# Patient Record
Sex: MALE | Race: WHITE | NOT HISPANIC OR LATINO | URBAN - METROPOLITAN AREA
[De-identification: names, ages, dates, MRNs, and addresses within clinical notes are randomized per-mention and may not be internally consistent; named-entity substitution may affect disease eponyms.]

---

## 2017-10-03 DIAGNOSIS — I10 BENIGN ESSENTIAL HYPERTENSION: ICD-10-CM

## 2017-10-05 RX ORDER — HYDROCHLOROTHIAZIDE 25 MG/1
12.5 TABLET ORAL DAILY
Qty: 30 TABLET | Refills: 0 | Status: SHIPPED | OUTPATIENT
Start: 2017-10-05 | End: 2017-12-26

## 2017-12-21 NOTE — PROGRESS NOTES
SUBJECTIVE:                                                    Franklin Mullen is a 42 year old male who presents to clinic today for the following health issues:    Hypertension Follow-up      Outpatient blood pressures are being checked at home.  Results are 145/90.    Low Salt Diet: no added salt        Amount of exercise or physical activity: None    Problems taking medications regularly: No    Medication side effects: none    Diet: regular (no restrictions)      Due for labs.  Blood pressure is uncontrolled/over goal: 140/90 at home typically.  Diastolic is high here.   Is not fasting but needs kidney function today. He will return fasting.  Is on HCTZ only 12.5 mg, never taken more. Was on lisinopril  Gave him cough and norvasc gave him headaches.     Decreased caffeine use. Less soda.   Alcohol 10-12 beers/week typically.  Does not feel depressed or feel need to help with alcohol use, he feels he can cut down on his own to help with blood pressure.   Exercise-used to exercise, does not anymore.  He knows he has to work on this and weill.     No chest pain, shortness of breath, pnd, or orthopnea.  Never smoked but chews. Discussed nicotine as stimulant. Encouraged cessation.   No dizziness or leg edema        Problem list and histories reviewed & adjusted, as indicated.  Additional history: as documented    Patient Active Problem List   Diagnosis     Hyperlipidemia with target LDL less than 130     Hypertension goal BP (blood pressure) < 140/90     Testicular/scrotal pain     Family history of premature coronary artery disease     Past Surgical History:   Procedure Laterality Date     VASECTOMY  6/23/2005       Social History   Substance Use Topics     Smoking status: Never Smoker     Smokeless tobacco: Current User     Types: Chew     Alcohol use 0.0 oz/week     0 Standard drinks or equivalent per week      Comment: occ.more in summer time-in the summer time a few beers a few times a week.       Family  "History   Problem Relation Age of Onset     Hypertension Father      HEART DISEASE Father 39     MI, stents age 50, was smoker     DIABETES Father      type 2     Hypertension Paternal Grandmother      CEREBROVASCULAR DISEASE Paternal Grandmother      in her 60's, smoker     Thyroid Disease No family hx of      OSTEOPOROSIS No family hx of      Genetic Disorder No family hx of      Prostate Cancer No family hx of      Colon Cancer No family hx of          Current Outpatient Prescriptions   Medication Sig Dispense Refill     hydrochlorothiazide (HYDRODIURIL) 25 MG tablet Take 1 tablet (25 mg) by mouth daily 90 tablet 1     [DISCONTINUED] hydrochlorothiazide (HYDRODIURIL) 25 MG tablet Take 0.5 tablets (12.5 mg) by mouth daily Patient needs lab and provider appointment for more refills 30 tablet 0     cyclobenzaprine (FLEXERIL) 5 MG tablet Take 1-2 tablets (5-10 mg) by mouth 3 times daily as needed for muscle spasms Do not drive or operate heavy machinery for 4 hours after taking. (Patient not taking: Reported on 12/26/2017) 42 tablet 1     Allergies   Allergen Reactions     Lisinopril Cough     Norvasc [Amlodipine] Other (See Comments)     headaches         ROS:  Constitutional, HEENT, cardiovascular, pulmonary, GI, , musculoskeletal, neuro, skin, endocrine and psych systems are negative, except as otherwise noted.      OBJECTIVE:   BP (!) 128/96  Pulse 86  Temp 98.5  F (36.9  C) (Oral)  Ht 5' 10\" (1.778 m)  Wt 222 lb (100.7 kg)  SpO2 98%  BMI 31.85 kg/m2  Body mass index is 31.85 kg/(m^2).  GENERAL: alert and no distress  RESP: lungs clear to auscultation - no rales, rhonchi or wheezes  CV: regular rate and rhythm, normal S1 S2, no S3 or S4, no murmur, click or rub, no peripheral edema and peripheral pulses strong  MS: no gross musculoskeletal defects noted, no edema  NEURO: Normal strength and tone, mentation intact and speech normal  PSYCH: mentation appears normal, affect " normal/bright        ASSESSMENT/PLAN:   ASSESSMENT / PLAN:  (I10) Benign essential hypertension  (primary encounter diagnosis)  Comment: above goal  Plan: hydrochlorothiazide (HYDRODIURIL) 25 MG tablet          Increase hCTZ      Patient Instructions   mychart with blood pressure readings in 3-4 weeks after making changes discussed  Goal:  Less than 130/80 for blood pressure  I will follow up with labs    IF blood pressure does not go down, lets add losartan to your blood pressure regime        Work on decreasing eliminating alcohol, nicotine, and adding exercise  Add losartan as next step if needed  Recheck in clinic in 1-2 months fasting for labs  Lose weight/overweight     Aisha Brunson PA-C  [facility]

## 2017-12-26 ENCOUNTER — OFFICE VISIT (OUTPATIENT)
Dept: FAMILY MEDICINE | Facility: CLINIC | Age: 42
End: 2017-12-26
Payer: COMMERCIAL

## 2017-12-26 VITALS
DIASTOLIC BLOOD PRESSURE: 96 MMHG | WEIGHT: 222 LBS | HEART RATE: 86 BPM | OXYGEN SATURATION: 98 % | SYSTOLIC BLOOD PRESSURE: 128 MMHG | HEIGHT: 70 IN | TEMPERATURE: 98.5 F | BODY MASS INDEX: 31.78 KG/M2

## 2017-12-26 DIAGNOSIS — I10 BENIGN ESSENTIAL HYPERTENSION: Primary | ICD-10-CM

## 2017-12-26 LAB
ALBUMIN SERPL-MCNC: 4 G/DL (ref 3.4–5)
ALP SERPL-CCNC: 68 U/L (ref 40–150)
ALT SERPL W P-5'-P-CCNC: 46 U/L (ref 0–70)
ANION GAP SERPL CALCULATED.3IONS-SCNC: 7 MMOL/L (ref 3–14)
AST SERPL W P-5'-P-CCNC: 17 U/L (ref 0–45)
BILIRUB SERPL-MCNC: 0.5 MG/DL (ref 0.2–1.3)
BUN SERPL-MCNC: 16 MG/DL (ref 7–30)
CALCIUM SERPL-MCNC: 8.8 MG/DL (ref 8.5–10.1)
CHLORIDE SERPL-SCNC: 105 MMOL/L (ref 94–109)
CO2 SERPL-SCNC: 26 MMOL/L (ref 20–32)
CREAT SERPL-MCNC: 0.94 MG/DL (ref 0.66–1.25)
GFR SERPL CREATININE-BSD FRML MDRD: 88 ML/MIN/1.7M2
GLUCOSE SERPL-MCNC: 88 MG/DL (ref 70–99)
POTASSIUM SERPL-SCNC: 3.8 MMOL/L (ref 3.4–5.3)
PROT SERPL-MCNC: 7.8 G/DL (ref 6.8–8.8)
SODIUM SERPL-SCNC: 138 MMOL/L (ref 133–144)

## 2017-12-26 PROCEDURE — 36415 COLL VENOUS BLD VENIPUNCTURE: CPT | Performed by: PHYSICIAN ASSISTANT

## 2017-12-26 PROCEDURE — 99214 OFFICE O/P EST MOD 30 MIN: CPT | Performed by: PHYSICIAN ASSISTANT

## 2017-12-26 PROCEDURE — 80053 COMPREHEN METABOLIC PANEL: CPT | Performed by: PHYSICIAN ASSISTANT

## 2017-12-26 RX ORDER — HYDROCHLOROTHIAZIDE 25 MG/1
25 TABLET ORAL DAILY
Qty: 90 TABLET | Refills: 1 | Status: SHIPPED | OUTPATIENT
Start: 2017-12-26 | End: 2019-01-16

## 2017-12-26 NOTE — NURSING NOTE
"Chief Complaint   Patient presents with     Hypertension     BP med check       Initial BP (!) 170/115  Pulse 86  Temp 98.5  F (36.9  C) (Oral)  Ht 5' 10\" (1.778 m)  Wt 222 lb (100.7 kg)  SpO2 98%  BMI 31.85 kg/m2 Estimated body mass index is 31.85 kg/(m^2) as calculated from the following:    Height as of this encounter: 5' 10\" (1.778 m).    Weight as of this encounter: 222 lb (100.7 kg).  Medication Reconciliation: complete      Valentín Laura MA    "

## 2017-12-26 NOTE — PATIENT INSTRUCTIONS
urbano with blood pressure readings in 3-4 weeks after making changes discussed  Goal:  Less than 130/80 for blood pressure  I will follow up with labs    IF blood pressure does not go down, lets add losartan to your blood pressure regime

## 2017-12-26 NOTE — MR AVS SNAPSHOT
After Visit Summary   12/26/2017    Franklin Mullen    MRN: [id number]           Patient Information     Date Of Birth          1975        Visit Information        Provider Department      12/26/2017 3:40 PM Aisha Brunson PA-C [facility]        Today's Diagnoses     Benign essential hypertension    -  1      Care Instructions    mychart with blood pressure readings in 3-4 weeks after making changes discussed  Goal:  Less than 130/80 for blood pressure  I will follow up with labs    IF blood pressure does not go down, lets add losartan to your blood pressure regime              Follow-ups after your visit        Who to contact     If you have questions or need follow up information about today's clinic visit or your schedule please contact [facility] directly at [phone].  Normal or non-critical lab and imaging results will be communicated to you by MyChart, letter or phone within 4 business days after the clinic has received the results. If you do not hear from us within 7 days, please contact the clinic through MyChart or phone. If you have a critical or abnormal lab result, we will notify you by phone as soon as possible.  Submit refill requests through "[employer] or call your pharmacy and they will forward the refill request to us. Please allow 3 business days for your refill to be completed.          Additional Information About Your Visit        MyChart Information     "[employer] gives you secure access to your electronic health record. If you see a primary care provider, you can also send messages to your care team and make appointments. If you have questions, please call your primary care clinic.  If you do not have a primary care provider, please call [phone] and they will assist you.        Care EveryWhere ID     This is your Care EveryWhere ID. This could be used by other organizations to access your [facility] medical records  [id number]       "  Your Vitals Were     Pulse Temperature Height Pulse Oximetry BMI (Body Mass Index)       86 98.5  F (36.9  C) (Oral) 5' 10\" (1.778 m) 98% 31.85 kg/m2        Blood Pressure from Last 3 Encounters:   12/26/17 (!) 128/96   06/06/16 138/86   05/19/16 139/89    Weight from Last 3 Encounters:   12/26/17 222 lb (100.7 kg)   06/06/16 215 lb (97.5 kg)   05/19/16 215 lb (97.5 kg)              Today, you had the following     No orders found for display         Today's Medication Changes          These changes are accurate as of: 12/26/17  4:07 PM.  If you have any questions, ask your nurse or doctor.               These medicines have changed or have updated prescriptions.        Dose/Directions    hydrochlorothiazide 25 MG tablet   Commonly known as:  HYDRODIURIL   This may have changed:    - how much to take  - additional instructions   Used for:  Benign essential hypertension   Changed by:  Aisha Brunson PA-C        Dose:  25 mg   Take 1 tablet (25 mg) by mouth daily   Quantity:  90 tablet   Refills:  1            Where to get your medicines      These medications were [place] to [facility] [place] [place] - [address]  [address] [place] [address], [place] [address]     Phone:  [phone]     hydrochlorothiazide 25 MG tablet                Primary Care Provider Office Phone # Fax #    Klaudia Husain [phone] [address] [facility] [address]        Equal Access to Services     [facility] : Hadkaden leon Soalbert, waaxda luqadaha, qaybta kaalmada adecynthia, waxay idiin hayaan adeeg kharash la'aan . So [facility] [phone].    ATENCIÓN: Si habla español, tiene a thompson disposición servicios gratuitos de asistencia lingüística. Llsulma al [phone].    We comply with applicable federal civil rights laws and [place] laws. We do not discriminate on the basis of race, color, national origin, age, disability, sex, " sexual orientation, or gender identity.            Thank you!     Thank you for choosing [facility]  [facility]. Our goal is always to provide you with excellent care. Hearing back from our patients is one way we can continue to improve our services. Please take a few minutes to complete the written survey that you may receive in the mail after your visit with us. Thank you!             Your Updated Medication List - Protect others around you: Learn how to safely use, store and throw away your medicines at www.disposemymeds.org.          This list is accurate as of: 12/26/17  4:07 PM.  Always use your most recent med list.                   Brand Name Dispense Instructions for use Diagnosis    cyclobenzaprine 5 MG tablet    FLEXERIL    42 tablet    Take 1-2 tablets (5-10 mg) by mouth 3 times daily as needed for muscle spasms Do not drive or operate heavy machinery for 4 hours after taking.    Chest wall pain       hydrochlorothiazide 25 MG tablet    HYDRODIURIL    90 tablet    Take 1 tablet (25 mg) by mouth daily    Benign essential hypertension

## 2017-12-27 NOTE — PROGRESS NOTES
Yanique Reid,       Your recent test results are attached, if you have any questions or concerns please feel free to contact me via e-mail or call [phone]. Blood sugar, kidney and liver function normal.        It was a pleasure to see you at your recent office visit.      Sincerely,  Aisha Brunson PA-C

## 2018-01-26 ENCOUNTER — OFFICE VISIT (OUTPATIENT)
Dept: URGENT CARE | Facility: URGENT CARE | Age: 43
End: 2018-01-26
Payer: COMMERCIAL

## 2018-01-26 VITALS
OXYGEN SATURATION: 97 % | WEIGHT: 220 LBS | HEART RATE: 95 BPM | SYSTOLIC BLOOD PRESSURE: 169 MMHG | TEMPERATURE: 97 F | BODY MASS INDEX: 31.57 KG/M2 | DIASTOLIC BLOOD PRESSURE: 117 MMHG

## 2018-01-26 DIAGNOSIS — J20.6 ACUTE BRONCHITIS DUE TO RHINOVIRUS: Primary | ICD-10-CM

## 2018-01-26 PROCEDURE — 99213 OFFICE O/P EST LOW 20 MIN: CPT | Performed by: FAMILY MEDICINE

## 2018-01-26 RX ORDER — FLUTICASONE PROPIONATE 44 UG/1
2 AEROSOL, METERED RESPIRATORY (INHALATION) 2 TIMES DAILY
Qty: 1 INHALER | Refills: 1 | Status: SHIPPED | OUTPATIENT
Start: 2018-01-26 | End: 2018-01-29

## 2018-01-26 ASSESSMENT — ENCOUNTER SYMPTOMS
NAUSEA: 0
VOMITING: 0
SINUS PAIN: 0
COUGH: 1
SPUTUM PRODUCTION: 1
DIARRHEA: 0
SORE THROAT: 1
CHILLS: 0
DIAPHORESIS: 0
SHORTNESS OF BREATH: 0
HEADACHES: 0
FEVER: 0
MYALGIAS: 0
WHEEZING: 1

## 2018-01-26 NOTE — NURSING NOTE
"Chief Complaint   Patient presents with     URI     Pt c/o URI with (some production) cough and congestion. Sx started about 4 weeks ago with no relief from cough.        Initial BP (!) 169/117 (BP Location: Left arm, Patient Position: Chair, Cuff Size: Adult Large)  Pulse 95  Temp 97  F (36.1  C) (Oral)  Wt 220 lb (99.8 kg)  SpO2 97%  BMI 31.57 kg/m2 Estimated body mass index is 31.57 kg/(m^2) as calculated from the following:    Height as of 12/26/17: 5' 10\" (1.778 m).    Weight as of this encounter: 220 lb (99.8 kg).  Medication Reconciliation: complete     Kathryn Wetzel CMA (AAMA)      "

## 2018-01-26 NOTE — MR AVS SNAPSHOT
After Visit Summary   1/26/2018    Franklin Mullen    MRN: [id number]           Patient Information     Date Of Birth          1975        Visit Information        Provider Department      1/26/2018 2:50 PM Robert Rowell MD [facility]        Today's Diagnoses     Acute bronchitis due to Rhinovirus    -  1      Care Instructions      Bronchitis, Viral (Adult)    You have a viral bronchitis. Bronchitis is inflammation and swelling of the lining of the lungs. This is often caused by an infection. Symptoms include a dry, hacking cough that is worse at night. The cough may bring up yellow-green mucus. You may also feel short of breath or wheeze. Other symptoms may include tiredness, chest discomfort, and chills.  Bronchitis that is caused by a virus is not treated with antibiotics. Instead, medicines may be given to help relieve symptoms. Symptoms can last up to 2 weeks, although the cough may last much longer.  This illness is contagious during the first few days and is spread through the air by coughing and sneezing, or by direct contact (touching the sick person and then touching your own eyes, nose, or mouth).  Most viral illnesses resolve within 10 to 14 days with rest and simple home remedies, although they may sometimes last for several weeks.  Home care    If symptoms are severe, rest at home for the first 2 to 3 days. When you go back to your usual activities, don't let yourself get too tired.    Do not smoke. Also avoid being exposed to secondhand smoke.    You may use over-the-counter medicine to control fever or pain, unless another pain medicine was prescribed. (Note: If you have chronic liver or kidney disease or have ever had a stomach ulcer or gastrointestinal bleeding, talk with your healthcare provider before using these medicines. Also talk to your provider if you are taking medicine to prevent blood clots.) Aspirin should never be given to anyone  younger than 18 years of age who is ill with a viral infection or fever. It may cause severe liver or brain damage.    Your appetite may be poor, so a light diet is fine. Avoid dehydration by drinking 6 to 8 glasses of fluids per day (such as water, soft drinks, sports drinks, juices, tea, or soup). Extra fluids will help loosen secretions in the nose and lungs.    Over-the-counter cough, cold, and sore-throat medicines will not shorten the length of the illness, but they may help to reduce symptoms. (Note: Do not use decongestants if you have high blood pressure.)  Follow-up care  Follow up with your healthcare provider, or as advised. If you had an X-ray or ECG (electrocardiogram), a specialist will review it. You will be notified of any new findings that may affect your care.  Note: If you are age 65 or older, or if you have a chronic lung disease or condition that affects your immune system, or you smoke, talk to your healthcare provider about having pneumococcal vaccinations and a yearly influenza vaccination (flu shot).  When to seek medical advice  Call your healthcare provider right away if any of these occur:    Fever of 100.4 F (38 C) or higher    Coughing up increased amounts of colored sputum    Weakness, drowsiness, headache, facial pain, ear pain, or a stiff neck  Call 911, or get immediate medical care  Contact emergency services right away if any of these occur:    Coughing up blood    Worsening weakness, drowsiness, headache, or stiff neck    Trouble breathing, wheezing, or pain with breathing  Date Last Reviewed: 9/13/2015 2000-2017 [employer]. [address] [place] [address]. All rights reserved. This information is not intended as a substitute for professional medical care. Always follow your healthcare professional's instructions.                Follow-ups after your visit        Follow-up notes from your care team     Return if symptoms worsen or fail to improve.       Who to contact     If you have questions or need follow up information about today's clinic visit or your schedule please contact [facility] directly at [phone].  Normal or non-critical lab and imaging results will be communicated to you by MyChart, letter or phone within 4 business days after the clinic has received the results. If you do not hear from us within 7 days, please contact the clinic through [employer] or phone. If you have a critical or abnormal lab result, we will notify you by phone as soon as possible.  Submit refill requests through [employer] or call your pharmacy and they will forward the refill request to us. Please allow 3 business days for your refill to be completed.          Additional Information About Your Visit        [employer] Information     [employer] gives you secure access to your electronic health record. If you see a primary care provider, you can also send messages to your care team and make appointments. If you have questions, please call your primary care clinic.  If you do not have a primary care provider, please call [phone] and they will assist you.        Care EveryWhere ID     This is your Care EveryWhere ID. This could be used by other organizations to access your [facility] medical records  [id number]        Your Vitals Were     Pulse Temperature Pulse Oximetry BMI (Body Mass Index)          95 97  F (36.1  C) (Oral) 97% 31.57 kg/m2         Blood Pressure from Last 3 Encounters:   01/26/18 (!) 169/117   12/26/17 (!) 128/96   06/06/16 138/86    Weight from Last 3 Encounters:   01/26/18 220 lb (99.8 kg)   12/26/17 222 lb (100.7 kg)   06/06/16 215 lb (97.5 kg)              Today, you had the following     No orders found for display         Today's Medication Changes          These changes are accurate as of 1/26/18  3:13 PM.  If you have any questions, ask your nurse or doctor.               Start taking these medicines.        Dose/Directions     fluticasone 50 MCG/BLIST Aepb   Commonly known as:  FLOVENT DISKUS   Used for:  Acute bronchitis due to Rhinovirus        Dose:  1 puff   Inhale 1 puff into the lungs 2 times daily   Quantity:  1 Inhaler   Refills:  0            Where to get your medicines      These medications were sent [place] [facility] [place] - [address]  [address], [facility] [address]     Phone:  [phone]     fluticasone 50 MCG/BLIST Aepb                Primary Care Provider Office Phone # Fax #    Klaudia Husain [phone] [address] [address] [facility] [address]        Equal Access to Services     NILS PHILLIPS : Hadii aad ku hadasho Sodiaali, waaxda luqadaha, qaybta kaalmada adeegyada, waxay idiin haylanden sweeney. So [facility] [phone].    ATENCIÓN: Si habla español, tiene a thompson disposición servicios gratuitos de asistencia lingüística. [facility] [phone].    We comply with applicable federal civil rights laws and [place] laws. We do not discriminate on the basis of race, color, national origin, age, disability, sex, sexual orientation, or gender identity.            Thank you!     Thank you for choosing [facility]  for your care. Our goal is always to provide you with excellent care. Hearing back from our patients is one way we can continue to improve our services. Please take a few minutes to complete the written survey that you may receive in the mail after your visit with us. Thank you!             Your Updated Medication List - Protect others around you: Learn how to safely use, store and throw away your medicines at www.disposemymeds.org.          This list is accurate as of 1/26/18  3:13 PM.  Always use your most recent med list.                   Brand Name Dispense Instructions for use Diagnosis    cyclobenzaprine 5 MG tablet    FLEXERIL    42 tablet    Take 1-2 tablets (5-10 mg) by mouth 3 times daily as needed  for muscle spasms Do not drive or operate heavy machinery for 4 hours after taking.    Chest wall pain       fluticasone 50 MCG/BLIST Aepb    FLOVENT DISKUS    1 Inhaler    Inhale 1 puff into the lungs 2 times daily    Acute bronchitis due to Rhinovirus       hydrochlorothiazide 25 MG tablet    HYDRODIURIL    90 tablet    Take 1 tablet (25 mg) by mouth daily    Benign essential hypertension

## 2018-01-26 NOTE — PATIENT INSTRUCTIONS

## 2018-01-26 NOTE — PROGRESS NOTES
SUBJECTIVE:   Franklin Mullen is a 42 year old male presenting with a chief complaint of   Chief Complaint   Patient presents with     URI     Pt c/o URI with (some production) cough and congestion. Sx started about 4 weeks ago with no relief from cough.    .    Onset of symptoms was 2 week(s) ago.  Course of illness is worsening.    Severity moderate  Current and Associated symptoms: runny nose and cough - non-productive  Treatment measures tried include OTC Cough med.  Predisposing factors include ill contact: School and recent illness had cold symptoms off/on since Christmas.  Now with persistant cough      Review of Systems   Constitutional: Negative for chills, diaphoresis, fever and malaise/fatigue.   HENT: Positive for congestion and sore throat. Negative for ear discharge, ear pain and sinus pain.    Respiratory: Positive for cough, sputum production and wheezing. Negative for shortness of breath.    Gastrointestinal: Negative for diarrhea, nausea and vomiting.   Musculoskeletal: Negative for myalgias.   Skin: Negative for rash.   Neurological: Negative for headaches.         Past Medical History:   Diagnosis Date     HTN (hypertension)      Current Outpatient Prescriptions   Medication Sig Dispense Refill     hydrochlorothiazide (HYDRODIURIL) 25 MG tablet Take 1 tablet (25 mg) by mouth daily 90 tablet 1     cyclobenzaprine (FLEXERIL) 5 MG tablet Take 1-2 tablets (5-10 mg) by mouth 3 times daily as needed for muscle spasms Do not drive or operate heavy machinery for 4 hours after taking. (Patient not taking: Reported on 1/26/2018) 42 tablet 1     Social History   Substance Use Topics     Smoking status: Never Smoker     Smokeless tobacco: Current User     Types: Chew     Alcohol use 0.0 oz/week     0 Standard drinks or equivalent per week      Comment: occ.more in summer time-in the summer time a few beers a few times a week.         OBJECTIVE  BP (!) 169/117 (BP Location: Left arm, Patient Position: Chair,  Cuff Size: Adult Large)  Pulse 95  Temp 97  F (36.1  C) (Oral)  Wt 220 lb (99.8 kg)  SpO2 97%  BMI 31.57 kg/m2    Physical Exam   Constitutional: He is well-developed, well-nourished, and in no distress. No distress.   HENT:   Head: Normocephalic and atraumatic.   Right Ear: Tympanic membrane, external ear and ear canal normal.   Left Ear: Tympanic membrane, external ear and ear canal normal.   Mouth/Throat: Oropharynx is clear and moist. Mucous membranes are not dry. No oropharyngeal exudate, posterior oropharyngeal erythema or tonsillar abscesses.   Eyes: EOM are normal. Right eye exhibits no discharge. Left eye exhibits no discharge.   Neck: Normal range of motion. Neck supple.   Cardiovascular: Normal rate, regular rhythm and normal heart sounds.    Pulmonary/Chest: Effort normal and breath sounds normal. No respiratory distress. He has no wheezes. He has no rales.   Musculoskeletal: Normal range of motion. He exhibits no edema.   Lymphadenopathy:     He has no cervical adenopathy.   Neurological: He is alert. No cranial nerve deficit. Gait normal.   Skin: Skin is warm and dry. No rash noted. He is not diaphoretic.   Psychiatric: Mood and affect normal.       Labs:  No results found for this or any previous visit (from the past 24 hour(s)).    X-Ray was not done.    ASSESSMENT:    No diagnosis found.     Medical Decision Making:    Differential Diagnosis:  URI Adult/Peds:  Bronchiolitis, Bronchitis-viral, Bronchospasm, Croup, Pneumonia and Viral upper respiratory illness    Serious Comorbid Conditions:  Adult:  None    PLAN:    URI Adult:  Tylenol, Ibuprofen, Fluids and Rx bronchitis  cortisone inhaler    Followup:    If not improving or if condition worsens, follow up with your Primary Care Provider    There are no Patient Instructions on file for this visit.

## 2018-01-29 ENCOUNTER — OFFICE VISIT (OUTPATIENT)
Dept: FAMILY MEDICINE | Facility: CLINIC | Age: 43
End: 2018-01-29
Payer: COMMERCIAL

## 2018-01-29 VITALS
BODY MASS INDEX: 31.78 KG/M2 | DIASTOLIC BLOOD PRESSURE: 90 MMHG | OXYGEN SATURATION: 98 % | WEIGHT: 222 LBS | TEMPERATURE: 98.3 F | RESPIRATION RATE: 19 BRPM | SYSTOLIC BLOOD PRESSURE: 150 MMHG | HEIGHT: 70 IN | HEART RATE: 88 BPM

## 2018-01-29 DIAGNOSIS — J20.9 ACUTE BRONCHITIS WITH SYMPTOMS > 10 DAYS: Primary | ICD-10-CM

## 2018-01-29 PROCEDURE — 99213 OFFICE O/P EST LOW 20 MIN: CPT | Performed by: PHYSICIAN ASSISTANT

## 2018-01-29 RX ORDER — AZITHROMYCIN 250 MG/1
TABLET, FILM COATED ORAL
Qty: 6 TABLET | Refills: 0 | Status: SHIPPED | OUTPATIENT
Start: 2018-01-29 | End: 2018-04-06

## 2018-01-29 RX ORDER — ALBUTEROL SULFATE 90 UG/1
2 AEROSOL, METERED RESPIRATORY (INHALATION) EVERY 6 HOURS PRN
Qty: 1 INHALER | Refills: 0 | Status: SHIPPED | OUTPATIENT
Start: 2018-01-29 | End: 2019-01-16

## 2018-01-29 RX ORDER — GUAIFENESIN AND DEXTROMETHORPHAN HYDROBROMIDE 1200; 60 MG/1; MG/1
1 TABLET, EXTENDED RELEASE ORAL 2 TIMES DAILY
Qty: 28 TABLET | Refills: 0 | Status: SHIPPED | OUTPATIENT
Start: 2018-01-29 | End: 2019-01-16

## 2018-01-29 ASSESSMENT — PAIN SCALES - GENERAL: PAINLEVEL: SEVERE PAIN (6)

## 2018-01-29 NOTE — PROGRESS NOTES
SUBJECTIVE:   Franklin Mullen is a 42 year old male who presents to clinic today for the following health issues:    Acute Illness   Acute illness concerns: fever, cough  Onset: 1 month    Fever: YES    Chills/Sweats: YES    Headache (location?): YES    Sinus Pressure:YES    Conjunctivitis:  no    Ear Pain: no    Rhinorrhea: YES    Congestion: YES- at night    Sore Throat: off and on     Cough: YES-productive of clear sputum    Wheeze: YES    Decreased Appetite: YES    Nausea: no    Vomiting: no    Diarrhea:  no    Dysuria/Freq.: no    Fatigue/Achiness: YES    Sick/Strep Exposure: YES- work     Therapies Tried and outcome: Flovent inhaler. Patient reports that he does not have asthma and the inhaler is not helping at all.           Problem list and histories reviewed & adjusted, as indicated.  Additional history: as documented    Patient Active Problem List   Diagnosis     Hyperlipidemia with target LDL less than 130     Hypertension goal BP (blood pressure) < 140/90     Testicular/scrotal pain     Family history of premature coronary artery disease     Past Surgical History:   Procedure Laterality Date     VASECTOMY  6/23/2005       Social History   Substance Use Topics     Smoking status: Never Smoker     Smokeless tobacco: Current User     Types: Chew     Alcohol use 0.0 oz/week     0 Standard drinks or equivalent per week      Comment: occ.more in summer time-in the summer time a few beers a few times a week.       Family History   Problem Relation Age of Onset     Hypertension Father      HEART DISEASE Father 39     MI, stents age 50, was smoker     DIABETES Father      type 2     Hypertension Paternal Grandmother      CEREBROVASCULAR DISEASE Paternal Grandmother      in her 60's, smoker     Thyroid Disease No family hx of      OSTEOPOROSIS No family hx of      Genetic Disorder No family hx of      Prostate Cancer No family hx of      Colon Cancer No family hx of          Current Outpatient Prescriptions  "  Medication Sig Dispense Refill     azithromycin (ZITHROMAX) 250 MG tablet Two tablets first day, then one tablet daily for four days. 6 tablet 0     albuterol (PROAIR HFA/PROVENTIL HFA/VENTOLIN HFA) 108 (90 BASE) MCG/ACT Inhaler Inhale 2 puffs into the lungs every 6 hours as needed for shortness of breath / dyspnea or wheezing 1 Inhaler 0     Dextromethorphan-Guaifenesin  MG TB12 Take 1 tablet by mouth 2 times daily 28 tablet 0     hydrochlorothiazide (HYDRODIURIL) 25 MG tablet Take 1 tablet (25 mg) by mouth daily 90 tablet 1     Allergies   Allergen Reactions     Lisinopril Cough     Norvasc [Amlodipine] Other (See Comments)     headaches          ROS:  Constitutional, HEENT, cardiovascular, pulmonary, gi and gu systems are negative, except as otherwise noted.    OBJECTIVE:     /90  Pulse 88  Temp 98.3  F (36.8  C) (Oral)  Resp 19  Ht 1.778 m (5' 10\")  Wt 100.7 kg (222 lb)  SpO2 98%  BMI 31.85 kg/m2  Body mass index is 31.85 kg/(m^2).  GENERAL: healthy, alert and no distress  EYES: Eyes grossly normal to inspection, PERRL and conjunctivae and sclerae normal  HENT: ear canals and TM's normal, nose and mouth without ulcers or lesions  NECK: no adenopathy, no asymmetry, masses, or scars and thyroid normal to palpation  RESP: no rales , no rhonchi and expiratory wheezes throughout  CV: regular rate and rhythm, normal S1 S2, no S3 or S4, no murmur, click or rub, no peripheral edema and peripheral pulses strong  ABDOMEN: soft, nontender, no hepatosplenomegaly, no masses and bowel sounds normal  MS: no gross musculoskeletal defects noted, no edema    Diagnostic Test Results:  none     ASSESSMENT/PLAN:       ICD-10-CM    1. Acute bronchitis with symptoms > 10 days J20.9 azithromycin (ZITHROMAX) 250 MG tablet     albuterol (PROAIR HFA/PROVENTIL HFA/VENTOLIN HFA) 108 (90 BASE) MCG/ACT Inhaler     Dextromethorphan-Guaifenesin  MG TB12      Albuterol 2 puffs every 4-6 hours as needed   Azithromycin " 2 tablets today then 1 tab daily for 4 more days   Mucinex DM 1 tab twice a day for 1-2 weeks   Increase water   Follow up in 5 days or as needed       Odalis Carroll PA-C  [facility]

## 2018-01-29 NOTE — PATIENT INSTRUCTIONS
Albuterol 2 puffs every 4-6 hours as needed   Azithromycin 2 tablets today then 1 tab daily for 4 more days   Mucinex DM 1 tab twice a day for 1-2 weeks   Increase water   Bronchitis, Antibiotic Treatment (Adult)    Bronchitis is an infection of the air passages (bronchial tubes) in your lungs. It often occurs when you have a cold. This illness is contagious during the first few days and is spread through the air by coughing and sneezing, or by direct contact (touching the sick person and then touching your own eyes, nose, or mouth).  Symptoms of bronchitis include cough with mucus (phlegm) and low-grade fever. Bronchitis usually lasts 7 to 14 days. Mild cases can be treated with simple home remedies. More severe infection is treated with an antibiotic.  Home care  Follow these guidelines when caring for yourself at home:    If your symptoms are severe, rest at home for the first 2 to 3 days. When you go back to your usual activities, don't let yourself get too tired.    Do not smoke. Also avoid being exposed to secondhand smoke.    You may use over-the-counter medicines to control fever or pain, unless another medicine was prescribed. (Note: If you have chronic liver or kidney disease or have ever had a stomach ulcer or gastrointestinal bleeding, talk with your healthcare provider before using these medicines. Also talk to your provider if you are taking medicine to prevent blood clots.) Aspirin should never be given to anyone younger than 18 years of age who is ill with a viral infection or fever. It may cause severe liver or brain damage.    Your appetite may be poor, so a light diet is fine. Avoid dehydration by drinking 6 to 8 glasses of fluids per day (such as water, soft drinks, sports drinks, juices, tea, or soup). Extra fluids will help loosen secretions in the nose and lungs.    Over-the-counter cough, cold, and sore-throat medicines will not shorten the length of the illness, but they may be helpful to  reduce symptoms. (Note: Do not use decongestants if you have high blood pressure.)    Finish all antibiotic medicine. Do this even if you are feeling better after only a few days.  Follow-up care  Follow up with your healthcare provider, or as advised. If you had an X-ray or ECG (electrocardiogram), a specialist will review it. You will be notified of any new findings that may affect your care.  Note: If you are age 65 or older, or if you have a chronic lung disease or condition that affects your immune system, or you smoke, talk to your healthcare provider about having pneumococcal vaccinations and a yearly influenza vaccination (flu shot).  When to seek medical advice  Call your healthcare provider right away if any of these occur:    Fever of 100.4 F (38 C) or higher    Coughing up increased amounts of colored sputum    Weakness, drowsiness, headache, facial pain, ear pain, or a stiff neck  Call 911, or get immediate medical care  Contact emergency services right away if any of these occur.    Coughing up blood    Worsening weakness, drowsiness, headache, or stiff neck    Trouble breathing, wheezing, or pain with breathing  Date Last Reviewed: 9/13/2015 2000-2017 [employer]. [address] [place] [address], [place] [address]. All rights reserved. This information is not intended as a substitute for professional medical care. Always follow your healthcare professional's instructions.

## 2018-01-29 NOTE — NURSING NOTE
"Chief Complaint   Patient presents with     Cough     Fever       Initial /90  Pulse 88  Temp 98.3  F (36.8  C) (Oral)  Resp 19  Ht 1.778 m (5' 10\")  Wt 100.7 kg (222 lb)  SpO2 98%  BMI 31.85 kg/m2 Estimated body mass index is 31.85 kg/(m^2) as calculated from the following:    Height as of this encounter: 1.778 m (5' 10\").    Weight as of this encounter: 100.7 kg (222 lb).  Medication Reconciliation: complete     Stefani Carcamo MA       "

## 2018-01-29 NOTE — MR AVS SNAPSHOT
After Visit Summary   1/29/2018    Franklin Mullen    MRN: [id number]           Patient Information     Date Of Birth          1975        Visit Information        Provider Department      1/29/2018 1:40 PM Odalis Carroll PA-C [facility]        Today's Diagnoses     Persistent cough for 3 weeks or longer    -  1      Care Instructions      Albuterol 2 puffs every 4-6 hours as needed   Azithromycin 2 tablets today then 1 tab daily for 4 more days   Mucinex DM 1 tab twice a day for 1-2 weeks   Increase water   Bronchitis, Antibiotic Treatment (Adult)    Bronchitis is an infection of the air passages (bronchial tubes) in your lungs. It often occurs when you have a cold. This illness is contagious during the first few days and is spread through the air by coughing and sneezing, or by direct contact (touching the sick person and then touching your own eyes, nose, or mouth).  Symptoms of bronchitis include cough with mucus (phlegm) and low-grade fever. Bronchitis usually lasts 7 to 14 days. Mild cases can be treated with simple home remedies. More severe infection is treated with an antibiotic.  Home care  Follow these guidelines when caring for yourself at home:    If your symptoms are severe, rest at home for the first 2 to 3 days. When you go back to your usual activities, don't let yourself get too tired.    Do not smoke. Also avoid being exposed to secondhand smoke.    You may use over-the-counter medicines to control fever or pain, unless another medicine was prescribed. (Note: If you have chronic liver or kidney disease or have ever had a stomach ulcer or gastrointestinal bleeding, talk with your healthcare provider before using these medicines. Also talk to your provider if you are taking medicine to prevent blood clots.) Aspirin should never be given to anyone younger than 18 years of age who is ill with a viral infection or fever. It may cause severe liver  or brain damage.    Your appetite may be poor, so a light diet is fine. Avoid dehydration by drinking 6 to 8 glasses of fluids per day (such as water, soft drinks, sports drinks, juices, tea, or soup). Extra fluids will help loosen secretions in the nose and lungs.    Over-the-counter cough, cold, and sore-throat medicines will not shorten the length of the illness, but they may be helpful to reduce symptoms. (Note: Do not use decongestants if you have high blood pressure.)    Finish all antibiotic medicine. Do this even if you are feeling better after only a few days.  Follow-up care  Follow up with your healthcare provider, or as advised. If you had an X-ray or ECG (electrocardiogram), a specialist will review it. You will be notified of any new findings that may affect your care.  Note: If you are age 65 or older, or if you have a chronic lung disease or condition that affects your immune system, or you smoke, talk to your healthcare provider about having pneumococcal vaccinations and a yearly influenza vaccination (flu shot).  When to seek medical advice  Call your healthcare provider right away if any of these occur:    Fever of 100.4 F (38 C) or higher    Coughing up increased amounts of colored sputum    Weakness, drowsiness, headache, facial pain, ear pain, or a stiff neck  Call 911, or get immediate medical care  Contact emergency services right away if any of these occur.    Coughing up blood    Worsening weakness, drowsiness, headache, or stiff neck    Trouble breathing, wheezing, or pain with breathing  Date Last Reviewed: 9/13/2015 2000-2017 [employer]. [address] [place] [address], [place] [address]. All rights reserved. This information is not intended as a substitute for professional medical care. Always follow your healthcare professional's instructions.                Follow-ups after your visit        Who to contact     If you have questions or need follow up information about today's  "clinic visit or your schedule please contact [facility] directly at [phone].  Normal or non-critical lab and imaging results will be communicated to you by [employer], letter or phone within 4 business days after the clinic has received the results. If you do not hear from us within 7 days, please contact the clinic through [employer] or phone. If you have a critical or abnormal lab result, we will notify you by phone as soon as possible.  Submit refill requests through [employer] or call your pharmacy and they will forward the refill request to us. Please allow 3 business days for your refill to be completed.          Additional Information About Your Visit        [employer] Information     [employer] gives you secure access to your electronic health record. If you see a primary care provider, you can also send messages to your care team and make appointments. If you have questions, please call your primary care clinic.  If you do not have a primary care provider, please call [phone] and they will assist you.        Care EveryWhere ID     This is your Care EveryWhere ID. This could be used by other organizations to access your May medical records  [id number]        Your Vitals Were     Pulse Temperature Respirations Height Pulse Oximetry BMI (Body Mass Index)    88 98.3  F (36.8  C) (Oral) 19 1.778 m (5' 10\") 98% 31.85 kg/m2       Blood Pressure from Last 3 Encounters:   01/29/18 150/90   01/26/18 (!) 169/117   12/26/17 (!) 128/96    Weight from Last 3 Encounters:   01/29/18 100.7 kg (222 lb)   01/26/18 99.8 kg (220 lb)   12/26/17 100.7 kg (222 lb)              Today, you had the following     No orders found for display         Today's Medication Changes          These changes are accurate as of 1/29/18  2:08 PM.  If you have any questions, ask your nurse or doctor.               Start taking these medicines.        Dose/Directions    albuterol 108 (90 BASE) MCG/ACT Inhaler   Commonly known " as:  PROAIR HFA/PROVENTIL HFA/VENTOLIN HFA   Used for:  Persistent cough for 3 weeks or longer   Started by:  Odalis Carroll PA-C        Dose:  2 puff   Inhale 2 puffs into the lungs every 6 hours as needed for shortness of breath / dyspnea or wheezing   Quantity:  1 Inhaler   Refills:  0       azithromycin 250 MG tablet   Commonly known as:  ZITHROMAX   Used for:  Persistent cough for 3 weeks or longer   Started by:  Odalis Carroll PA-C        Two tablets first day, then one tablet daily for four days.   Quantity:  6 tablet   Refills:  0       Dextromethorphan-Guaifenesin  MG Tb12   Used for:  Persistent cough for 3 weeks or longer   Started by:  Odalis Carroll PA-C        Dose:  1 tablet   Take 1 tablet by mouth 2 times daily   Quantity:  28 tablet   Refills:  0         Stop taking these medicines if you haven't already. Please contact your care team if you have questions.     fluticasone 44 MCG/ACT Inhaler   Commonly known as:  FLOVENT HFA   Stopped by:  Odalis Carroll PA-C                Where to get your medicines      These medications were [place] [facility] [place] - [address]  [address], [facility] [address]     Phone:  [phone]     albuterol 108 (90 BASE) MCG/ACT Inhaler    azithromycin 250 MG tablet    Dextromethorphan-Guaifenesin  MG Tb12                Primary Care Provider Office Phone # Fax #    Klaudia Husain [phone] [address] [facility] [address]        Equal Access to Services     [facility] : Destin Suero, waozda lunathan, qapaty kaalmaashley kwan, reese sweeney. Dee [facility] [phone].    ATENCIÓN: Si habla español, tiene a thompson disposición servicios gratuitos de asistencia lingüística. Llame al [phone].    We comply with applicable federal civil rights laws and [place]  laws. We do not discriminate on the basis of race, color, national origin, age, disability, sex, sexual orientation, or gender identity.            Thank you!     Thank you for choosing [facility]  for your care. Our goal is always to provide you with excellent care. Hearing back from our patients is one way we can continue to improve our services. Please take a few minutes to complete the written survey that you may receive in the mail after your visit with us. Thank you!             Your Updated Medication List - Protect others around you: Learn how to safely use, store and throw away your medicines at www.disposemymeds.org.          This list is accurate as of 1/29/18  2:08 PM.  Always use your most recent med list.                   Brand Name Dispense Instructions for use Diagnosis    albuterol 108 (90 BASE) MCG/ACT Inhaler    PROAIR HFA/PROVENTIL HFA/VENTOLIN HFA    1 Inhaler    Inhale 2 puffs into the lungs every 6 hours as needed for shortness of breath / dyspnea or wheezing    Persistent cough for 3 weeks or longer       azithromycin 250 MG tablet    ZITHROMAX    6 tablet    Two tablets first day, then one tablet daily for four days.    Persistent cough for 3 weeks or longer       Dextromethorphan-Guaifenesin  MG Tb12     28 tablet    Take 1 tablet by mouth 2 times daily    Persistent cough for 3 weeks or longer       hydrochlorothiazide 25 MG tablet    HYDRODIURIL    90 tablet    Take 1 tablet (25 mg) by mouth daily    Benign essential hypertension

## 2018-04-06 ENCOUNTER — RADIANT APPOINTMENT (OUTPATIENT)
Dept: GENERAL RADIOLOGY | Facility: CLINIC | Age: 43
End: 2018-04-06
Attending: PHYSICIAN ASSISTANT
Payer: COMMERCIAL

## 2018-04-06 ENCOUNTER — OFFICE VISIT (OUTPATIENT)
Dept: URGENT CARE | Facility: URGENT CARE | Age: 43
End: 2018-04-06
Payer: COMMERCIAL

## 2018-04-06 VITALS — DIASTOLIC BLOOD PRESSURE: 74 MMHG | SYSTOLIC BLOOD PRESSURE: 146 MMHG

## 2018-04-06 DIAGNOSIS — J22 LOWER RESPIRATORY TRACT INFECTION: Primary | ICD-10-CM

## 2018-04-06 PROCEDURE — 71046 X-RAY EXAM CHEST 2 VIEWS: CPT | Mod: FY

## 2018-04-06 PROCEDURE — 99213 OFFICE O/P EST LOW 20 MIN: CPT | Performed by: PHYSICIAN ASSISTANT

## 2018-04-06 RX ORDER — BENZONATATE 200 MG/1
200 CAPSULE ORAL 3 TIMES DAILY PRN
Qty: 30 CAPSULE | Refills: 0 | Status: SHIPPED | OUTPATIENT
Start: 2018-04-06 | End: 2018-04-16

## 2018-04-06 RX ORDER — ALBUTEROL SULFATE 90 UG/1
2 AEROSOL, METERED RESPIRATORY (INHALATION) EVERY 4 HOURS PRN
Qty: 1 INHALER | Refills: 0 | Status: SHIPPED | OUTPATIENT
Start: 2018-04-06 | End: 2019-01-16

## 2018-04-06 RX ORDER — AZITHROMYCIN 250 MG/1
TABLET, FILM COATED ORAL
Qty: 6 TABLET | Refills: 0 | Status: SHIPPED | OUTPATIENT
Start: 2018-04-06 | End: 2019-01-16

## 2018-04-06 ASSESSMENT — ENCOUNTER SYMPTOMS
HEMOPTYSIS: 0
DIAPHORESIS: 0
CHILLS: 1
EYE PAIN: 0
SPUTUM PRODUCTION: 1
GASTROINTESTINAL NEGATIVE: 1
CARDIOVASCULAR NEGATIVE: 1
FEVER: 1
COUGH: 1
SHORTNESS OF BREATH: 0
WEIGHT LOSS: 0
HEADACHES: 1
PALPITATIONS: 0
WHEEZING: 1

## 2018-04-06 NOTE — PROGRESS NOTES
SUBJECTIVE:                                                      HPI  Franklin Mullen is a 42 year old male who presents to clinic today for the following health issues:  RESPIRATORY SYMPTOMS    Duration: 1 week    Description   Productive cough along with chest congestion and wheezing, no shortness of breath or hemoptysis.     Severity: mild    Accompanying signs and symptoms:  Also reports nasal congestion, rhinorrhea, headache, fatigue/malaise. No abdominal pain, n/v, constipation, diarrhea, bloody or black tarry stools.  No chills or sweats.    History (predisposing factors):  No ill contacts.  No pmh of asthma.  Non-smoker.    Precipitating or alleviating factors: None    Therapies tried and outcome:  Mucinex, and Sudafed - did not help      Reviewed PMH.  Patient Active Problem List   Diagnosis     Hyperlipidemia with target LDL less than 130     Hypertension goal BP (blood pressure) < 140/90     Testicular/scrotal pain     Family history of premature coronary artery disease     Current Outpatient Prescriptions   Medication Sig Dispense Refill     albuterol (PROAIR HFA/PROVENTIL HFA/VENTOLIN HFA) 108 (90 BASE) MCG/ACT Inhaler Inhale 2 puffs into the lungs every 6 hours as needed for shortness of breath / dyspnea or wheezing 1 Inhaler 0     Dextromethorphan-Guaifenesin  MG TB12 Take 1 tablet by mouth 2 times daily 28 tablet 0     hydrochlorothiazide (HYDRODIURIL) 25 MG tablet Take 1 tablet (25 mg) by mouth daily 90 tablet 1     Allergies   Allergen Reactions     Lisinopril Cough     Norvasc [Amlodipine] Other (See Comments)     headaches       Review of Systems   Constitutional: Positive for chills and fever. Negative for diaphoresis and weight loss.   HENT: Positive for congestion.    Eyes: Negative for pain.   Respiratory: Positive for cough, sputum production and wheezing. Negative for hemoptysis and shortness of breath.    Cardiovascular: Negative.  Negative for chest pain and palpitations.    Gastrointestinal: Negative.    Skin: Negative.    Neurological: Positive for headaches.   All other systems reviewed and are negative.      /74  Pulse (P) 85  Temp (P) 98.7  F (37.1  C) (Tympanic)  Wt (P) 220 lb 9.6 oz (100.1 kg)  SpO2 (P) 98%  BMI (P) 31.65 kg/m2  Physical Exam   Constitutional: He is oriented to person, place, and time and well-developed, well-nourished, and in no distress. No distress.   HENT:   Head: Normocephalic and atraumatic.   Nose: Nose normal.   Mouth/Throat: Uvula is midline, oropharynx is clear and moist and mucous membranes are normal. No oropharyngeal exudate or posterior oropharyngeal erythema.   TMs are intact without any erythema or bulging bilaterally.  Airway is patent.   Eyes: Conjunctivae and EOM are normal. Pupils are equal, round, and reactive to light. No scleral icterus.   Neck: Normal range of motion. Neck supple. No thyromegaly present.   Cardiovascular: Normal rate, regular rhythm, normal heart sounds and intact distal pulses.  Exam reveals no gallop and no friction rub.    No murmur heard.  Pulmonary/Chest: Effort normal and breath sounds normal. No accessory muscle usage. No tachypnea. No respiratory distress. He has no decreased breath sounds. He has no wheezes. He has no rhonchi. He has no rales.   Coarse breath sounds.   Lymphadenopathy:     He has no cervical adenopathy.   Neurological: He is alert and oriented to person, place, and time.   Skin: Skin is warm, dry and intact. No cyanosis. Nails show no clubbing.   Psychiatric: Mood and affect normal.   Nursing note and vitals reviewed.  CXR PA/lateral:  No infiltrates, effusions or pneumothorax.  No suspicious nodules or lesions. No fractures.   Will send for overread.      Assessment/Plan:  Lower respiratory tract infection:  CXR was negative for pneumonia.  Will treat with zithromax X5days, tessalon perles, and albuterol inh as needed for symptoms.  Recommend treatment with rest, fluids and chicken  soup. Tylenol/ibuprofen prn fever/pain.  Recheck in clinic if symptoms worsen or if symptoms do not improve.  To the ER if he develops hemoptysis, chest pain, fevers>102, worsening shortness of breath/wheezing.    -     XR Chest 2 Views  -     azithromycin (ZITHROMAX) 250 MG tablet; 2 tablets the first day, then 1 tablet daily for the next 4 days  -     albuterol (PROAIR HFA/PROVENTIL HFA/VENTOLIN HFA) 108 (90 BASE) MCG/ACT Inhaler; Inhale 2 puffs into the lungs every 4 hours as needed for shortness of breath / dyspnea or wheezing  -     benzonatate (TESSALON) 200 MG capsule; Take 1 capsule (200 mg) by mouth 3 times daily as needed for cough          Zoraida See ANTONIA Joaquin

## 2018-04-06 NOTE — MR AVS SNAPSHOT
After Visit Summary   4/6/2018    Franklin Mullen    MRN: [id number]           Patient Information     Date Of Birth          1975        Visit Information        Provider Department      4/6/2018 5:05 PM Zoraida Joaquin PA-C [facility]        Today's Diagnoses     Lower respiratory tract infection    -  1       Follow-ups after your visit        Who to contact     If you have questions or need follow up information about today's clinic visit or your schedule please contact [facility] [place] [facility] directly at [phone].  Normal or non-critical lab and imaging results will be communicated to you by Centrixhart, letter or phone within 4 business days after the clinic has received the results. If you do not hear from us within 7 days, please contact the clinic through viVoodt or phone. If you have a critical or abnormal lab result, we will notify you by phone as soon as possible.  Submit refill requests through [employer] or call your pharmacy and they will forward the refill request to us. Please allow 3 business days for your refill to be completed.          Additional Information About Your Visit        MyChart Information     [employer] gives you secure access to your electronic health record. If you see a primary care provider, you can also send messages to your care team and make appointments. If you have questions, please call your primary care clinic.  If you do not have a primary care provider, please call [phone] and they will assist you.        Care EveryWhere ID     This is your Care EveryWhere ID. This could be used by other organizations to access your [facility] medical records  [id number]         Blood Pressure from Last 3 Encounters:   04/06/18 146/74   01/29/18 150/90   01/26/18 (!) 169/117    Weight from Last 3 Encounters:   04/06/18 (P) 220 lb 9.6 oz (100.1 kg)   01/29/18 222 lb (100.7 kg)   01/26/18 220 lb (99.8 kg)              We Performed the Following     XR Chest  2 Views          Today's Medication Changes          These changes are accurate as of 4/6/18  6:05 PM.  If you have any questions, ask your nurse or doctor.               Start taking these medicines.        Dose/Directions    azithromycin 250 MG tablet   Commonly known as:  ZITHROMAX   Used for:  Lower respiratory tract infection   Started by:  Zoraida Joaquin PA-C        2 tablets the first day, then 1 tablet daily for the next 4 days   Quantity:  6 tablet   Refills:  0       benzonatate 200 MG capsule   Commonly known as:  TESSALON   Used for:  Lower respiratory tract infection   Started by:  Zoraida Joaquin PA-C        Dose:  200 mg   Take 1 capsule (200 mg) by mouth 3 times daily as needed for cough   Quantity:  30 capsule   Refills:  0         These medicines have changed or have updated prescriptions.        Dose/Directions    * albuterol 108 (90 BASE) MCG/ACT Inhaler   Commonly known as:  PROAIR HFA/PROVENTIL HFA/VENTOLIN HFA   This may have changed:  Another medication with the same name was added. Make sure you understand how and when to take each.   Used for:  Acute bronchitis with symptoms > 10 days   Changed by:  Zoraida Joaquin PA-C        Dose:  2 puff   Inhale 2 puffs into the lungs every 6 hours as needed for shortness of breath / dyspnea or wheezing   Quantity:  1 Inhaler   Refills:  0       * albuterol 108 (90 BASE) MCG/ACT Inhaler   Commonly known as:  PROAIR HFA/PROVENTIL HFA/VENTOLIN HFA   This may have changed:  You were already taking a medication with the same name, and this prescription was added. Make sure you understand how and when to take each.   Used for:  Lower respiratory tract infection   Changed by:  Zoraida Joaquin PA-C        Dose:  2 puff   Inhale 2 puffs into the lungs every 4 hours as needed for shortness of breath / dyspnea or wheezing   Quantity:  1 Inhaler   Refills:  0       * Notice:  This list has 2 medication(s) that are the same as other medications prescribed for you.  Read the directions carefully, and ask your doctor or other care provider to review them with you.         Where to get your medicines      These medications were sent to [facility] [place] [address] [place] [address]  [address] [place] [address], [place] [address]     Phone:  [phone]     albuterol 108 (90 BASE) MCG/ACT Inhaler    azithromycin 250 MG tablet    benzonatate 200 MG capsule                Primary Care Provider Office Phone # Fax #    Klaudia Husain [phone] [address] [facility] [address]        Equal Access to Services     [facility]: Hadii talib sharp hadasho Soalbert, waaxda luqadaha, qaybta kaalmada aniya, reese germain . So [facility] [phone].    ATENCIÓN: Si habla español, tiene a thompson disposición servicios gratuitos de asistencia lingüística. [facility] [phone].    We comply with applicable federal civil rights laws and [place] laws. We do not discriminate on the basis of race, color, national origin, age, disability, sex, sexual orientation, or gender identity.            Thank you!     Thank you for choosing [facility]  for your care. Our goal is always to provide you with excellent care. Hearing back from our patients is one way we can continue to improve our services. Please take a few minutes to complete the written survey that you may receive in the mail after your visit with us. Thank you!             Your Updated Medication List - Protect others around you: Learn how to safely use, store and throw away your medicines at www.disposemymeds.org.          This list is accurate as of 4/6/18  6:05 PM.  Always use your most recent med list.                   Brand Name Dispense Instructions for use Diagnosis    * albuterol 108 (90 BASE) MCG/ACT Inhaler    PROAIR HFA/PROVENTIL HFA/VENTOLIN HFA    1 Inhaler    Inhale 2 puffs into the lungs every 6 hours as  needed for shortness of breath / dyspnea or wheezing    Acute bronchitis with symptoms > 10 days       * albuterol 108 (90 BASE) MCG/ACT Inhaler    PROAIR HFA/PROVENTIL HFA/VENTOLIN HFA    1 Inhaler    Inhale 2 puffs into the lungs every 4 hours as needed for shortness of breath / dyspnea or wheezing    Lower respiratory tract infection       azithromycin 250 MG tablet    ZITHROMAX    6 tablet    2 tablets the first day, then 1 tablet daily for the next 4 days    Lower respiratory tract infection       benzonatate 200 MG capsule    TESSALON    30 capsule    Take 1 capsule (200 mg) by mouth 3 times daily as needed for cough    Lower respiratory tract infection       Dextromethorphan-Guaifenesin  MG Tb12     28 tablet    Take 1 tablet by mouth 2 times daily    Acute bronchitis with symptoms > 10 days       hydrochlorothiazide 25 MG tablet    HYDRODIURIL    90 tablet    Take 1 tablet (25 mg) by mouth daily    Benign essential hypertension       * Notice:  This list has 2 medication(s) that are the same as other medications prescribed for you. Read the directions carefully, and ask your doctor or other care provider to review them with you.

## 2019-01-16 ENCOUNTER — OFFICE VISIT (OUTPATIENT)
Dept: FAMILY MEDICINE | Facility: CLINIC | Age: 44
End: 2019-01-16
Payer: COMMERCIAL

## 2019-01-16 VITALS
BODY MASS INDEX: 29.35 KG/M2 | HEIGHT: 70 IN | OXYGEN SATURATION: 98 % | DIASTOLIC BLOOD PRESSURE: 94 MMHG | SYSTOLIC BLOOD PRESSURE: 146 MMHG | WEIGHT: 205 LBS | TEMPERATURE: 97.4 F | HEART RATE: 73 BPM | RESPIRATION RATE: 16 BRPM

## 2019-01-16 DIAGNOSIS — Z82.49 FAMILY HISTORY OF PREMATURE CORONARY ARTERY DISEASE: ICD-10-CM

## 2019-01-16 DIAGNOSIS — I10 BENIGN ESSENTIAL HYPERTENSION: Primary | ICD-10-CM

## 2019-01-16 DIAGNOSIS — N52.9 ERECTILE DYSFUNCTION, UNSPECIFIED ERECTILE DYSFUNCTION TYPE: ICD-10-CM

## 2019-01-16 PROCEDURE — 99214 OFFICE O/P EST MOD 30 MIN: CPT | Performed by: PHYSICIAN ASSISTANT

## 2019-01-16 RX ORDER — LOSARTAN POTASSIUM 50 MG/1
50 TABLET ORAL DAILY
Qty: 90 TABLET | Refills: 0 | Status: SHIPPED | OUTPATIENT
Start: 2019-01-16 | End: 2019-06-18

## 2019-01-16 RX ORDER — SILDENAFIL CITRATE 20 MG/1
20-100 TABLET ORAL DAILY PRN
Qty: 90 TABLET | Refills: 0 | Status: SHIPPED | OUTPATIENT
Start: 2019-01-16 | End: 2021-05-20

## 2019-01-16 RX ORDER — HYDROCHLOROTHIAZIDE 25 MG/1
25 TABLET ORAL DAILY
Qty: 90 TABLET | Refills: 0 | Status: SHIPPED | OUTPATIENT
Start: 2019-01-16 | End: 2019-06-18

## 2019-01-16 RX ORDER — ATORVASTATIN CALCIUM 20 MG/1
20 TABLET, FILM COATED ORAL DAILY
Qty: 90 TABLET | Refills: 3 | Status: SHIPPED | OUTPATIENT
Start: 2019-01-16 | End: 2021-02-09

## 2019-01-16 ASSESSMENT — MIFFLIN-ST. JEOR: SCORE: 1826.12

## 2019-01-16 NOTE — LETTER
[facility]  [address] [address] [phone]        August 26, 2019    Franklin Mullen  [address] [address] [place] [address]              Dear Franklin Mullen    This is to remind you that your Fasting lab is due.    You may call our office at [phone] to schedule an appointment.    Please disregard this notice if you have already had your labs drawn or made an appointment.        Sincerely,        Karthik GARCES           126

## 2019-03-26 ENCOUNTER — TELEPHONE (OUTPATIENT)
Dept: FAMILY MEDICINE | Facility: CLINIC | Age: 44
End: 2019-03-26

## 2019-03-26 NOTE — TELEPHONE ENCOUNTER
Panel Management Review      Patient has the following on his problem list:     Hypertension   Last three blood pressure readings:  BP Readings from Last 3 Encounters:   01/16/19 (!) 146/94   04/06/18 146/74   01/29/18 150/90     Blood pressure: FAILED    HTN Guidelines:  Age 18-59 BP range:  Less than 140/90  Age 60-85 with Diabetes:  Less than 140/90  Age 60-85 without Diabetes:  less than 150/90      Composite cancer screening  Chart review shows that this patient is due/due soon for the following None  Summary:    Patient is due/failing the following:   BP CHECK    Action needed:   bp check with pharmacy    Type of outreach:    Sent letter.    Questions for provider review:    None                                                                                                                                    Jessica Chavarria CMA       Chart routed to closed .

## 2019-03-26 NOTE — LETTER
[facility]  [address] [facility] [address] [facility] [address]  Phone: [phone]    03/26/19    Franklin Mullen  [address] [address] [address]      Franklin,      Our records indicate that you have not came in for a blood pressure check with pharmacy which was recommended by your health care team.     Monitoring and managing your preventative and chronic health conditions are very important to us.     If you have received your health care elsewhere, please call the clinic so the information can be documented in your chart.    Please call [phone] or message us through your theRightAPI account to schedule an appointment or provide information for your chart.     I look forward to seeing you and working with you on your health care needs.     Sincerely,       Your [facility] Team/rb              *If you have already scheduled an appointment, please disregard this reminder

## 2019-06-18 ENCOUNTER — OFFICE VISIT (OUTPATIENT)
Dept: FAMILY MEDICINE | Facility: CLINIC | Age: 44
End: 2019-06-18
Payer: COMMERCIAL

## 2019-06-18 VITALS
DIASTOLIC BLOOD PRESSURE: 89 MMHG | RESPIRATION RATE: 16 BRPM | WEIGHT: 221 LBS | HEIGHT: 70 IN | SYSTOLIC BLOOD PRESSURE: 130 MMHG | HEART RATE: 63 BPM | BODY MASS INDEX: 31.64 KG/M2 | OXYGEN SATURATION: 96 % | TEMPERATURE: 97.8 F

## 2019-06-18 DIAGNOSIS — Z82.49 FAMILY HISTORY OF PREMATURE CORONARY ARTERY DISEASE: ICD-10-CM

## 2019-06-18 DIAGNOSIS — I10 BENIGN ESSENTIAL HYPERTENSION: ICD-10-CM

## 2019-06-18 LAB
ANION GAP SERPL CALCULATED.3IONS-SCNC: 7 MMOL/L (ref 3–14)
BUN SERPL-MCNC: 18 MG/DL (ref 7–30)
CALCIUM SERPL-MCNC: 9.2 MG/DL (ref 8.5–10.1)
CHLORIDE SERPL-SCNC: 106 MMOL/L (ref 94–109)
CO2 SERPL-SCNC: 27 MMOL/L (ref 20–32)
CREAT SERPL-MCNC: 0.99 MG/DL (ref 0.66–1.25)
GFR SERPL CREATININE-BSD FRML MDRD: >90 ML/MIN/{1.73_M2}
GLUCOSE SERPL-MCNC: 84 MG/DL (ref 70–99)
POTASSIUM SERPL-SCNC: 4.1 MMOL/L (ref 3.4–5.3)
SODIUM SERPL-SCNC: 140 MMOL/L (ref 133–144)

## 2019-06-18 PROCEDURE — 80048 BASIC METABOLIC PNL TOTAL CA: CPT | Performed by: PHYSICIAN ASSISTANT

## 2019-06-18 PROCEDURE — 36415 COLL VENOUS BLD VENIPUNCTURE: CPT | Performed by: PHYSICIAN ASSISTANT

## 2019-06-18 PROCEDURE — 99214 OFFICE O/P EST MOD 30 MIN: CPT | Performed by: PHYSICIAN ASSISTANT

## 2019-06-18 RX ORDER — HYDROCHLOROTHIAZIDE 25 MG/1
25 TABLET ORAL DAILY
Qty: 90 TABLET | Refills: 1 | Status: SHIPPED | OUTPATIENT
Start: 2019-06-18 | End: 2020-02-03

## 2019-06-18 RX ORDER — LOSARTAN POTASSIUM 50 MG/1
50 TABLET ORAL DAILY
Qty: 90 TABLET | Refills: 1 | Status: SHIPPED | OUTPATIENT
Start: 2019-06-18 | End: 2020-02-03

## 2019-06-18 ASSESSMENT — PAIN SCALES - GENERAL: PAINLEVEL: NO PAIN (0)

## 2019-06-18 ASSESSMENT — MIFFLIN-ST. JEOR: SCORE: 1898.7

## 2019-06-18 NOTE — PROGRESS NOTES
Subjective     Franklin Mullen is a 43 year old male who presents to clinic today for the following health issues:    HPI   Hypertension Follow-up      Do you check your blood pressure regularly outside of the clinic? No     Are you following a low salt diet? Yes    Are your blood pressures ever more than 140 on the top number (systolic) OR more     than 90 on the bottom number (diastolic), for example 140/90? yes        Amount of exercise or physical activity: 1 day/week for an average of 30-60 minutes    Problems taking medications regularly: Yes currently out of medication x1 week    Medication side effects: none    Diet: low salt      Hyperlipidemia Follow-Up      Are you having any of the following symptoms? (Select all that apply)  No complaints of shortness of breath, chest pain or pressure.  No increased sweating or nausea with activity.  No left-sided neck or arm pain.  No complaints of pain in calves when walking 1-2 blocks.    Are you regularly taking any medication or supplement to lower your cholesterol?   No    Are you having muscle aches or other side effects that you think could be caused by your cholesterol lowering medication?  No        Patient Active Problem List   Diagnosis     Hyperlipidemia with target LDL less than 130     Testicular/scrotal pain     Family history of premature coronary artery disease     Benign essential hypertension     Erectile dysfunction, unspecified erectile dysfunction type     Past Surgical History:   Procedure Laterality Date     VASECTOMY  6/23/2005       Social History     Tobacco Use     Smoking status: Never Smoker     Smokeless tobacco: Current User     Types: Chew   Substance Use Topics     Alcohol use: Yes     Alcohol/week: 0.0 oz     Comment: occ.more in summer time-in the summer time a few beers a few times a week.       Family History   Problem Relation Age of Onset     Hypertension Father      Heart Disease Father 39        MI, stents age 50, was smoker      "Diabetes Father         type 2     Hypertension Paternal Grandmother      Cerebrovascular Disease Paternal Grandmother         in her 60's, smoker     Thyroid Disease No family hx of      Osteoporosis No family hx of      Genetic Disorder No family hx of      Prostate Cancer No family hx of      Colon Cancer No family hx of          Current Outpatient Medications   Medication Sig Dispense Refill     atorvastatin (LIPITOR) 20 MG tablet Take 1 tablet (20 mg) by mouth daily 90 tablet 3     hydrochlorothiazide (HYDRODIURIL) 25 MG tablet Take 1 tablet (25 mg) by mouth daily 90 tablet 1     losartan (COZAAR) 50 MG tablet Take 1 tablet (50 mg) by mouth daily 90 tablet 1     sildenafil (REVATIO) 20 MG tablet Take 1-5 tablets ( mg) by mouth daily as needed (.) Take 1 hr prior to sexual activity 90 tablet 0     Allergies   Allergen Reactions     Lisinopril Cough     Norvasc [Amlodipine] Other (See Comments)     headaches       Reviewed and updated as needed this visit by Provider  Tobacco  Allergies  Meds  Problems  Med Hx  Surg Hx  Fam Hx         Review of Systems   ROS COMP: Constitutional, HEENT, cardiovascular, pulmonary, gi and gu systems are negative, except as otherwise noted.      Objective    /89   Pulse 63   Temp 97.8  F (36.6  C) (Oral)   Resp 16   Ht 1.778 m (5' 10\")   Wt 100.2 kg (221 lb)   SpO2 96%   BMI 31.71 kg/m    Body mass index is 31.71 kg/m .  Physical Exam   GENERAL: healthy, alert and no distress  NECK: no adenopathy, no asymmetry, masses, or scars and thyroid normal to palpation  RESP: lungs clear to auscultation - no rales, rhonchi or wheezes  CV: regular rate and rhythm, normal S1 S2, no S3 or S4, no murmur, click or rub, no peripheral edema and peripheral pulses strong  ABDOMEN: soft, nontender, no hepatosplenomegaly, no masses and bowel sounds normal  MS: no gross musculoskeletal defects noted, no edema    Diagnostic Test Results:  [facility] is pending         Assessment & Plan " "      ICD-10-CM    1. Benign essential hypertension I10 losartan (COZAAR) 50 MG tablet     hydrochlorothiazide (HYDRODIURIL) 25 MG tablet     Basic metabolic panel  (Ca, Cl, CO2, Creat, Gluc, K, Na, BUN)   2. Family history of premature coronary artery disease Z82.49      1.restart Losartan 50 mg and hydrochlorothiazide  Follow up in 6 months   2. Follow up fasting to check cholesterol and star taking Lipitor.     Tobacco Cessation:   reports that he has never smoked. His smokeless tobacco use includes chew.        BMI:   Estimated body mass index is 31.71 kg/m  as calculated from the following:    Height as of this encounter: 1.778 m (5' 10\").    Weight as of this encounter: 100.2 kg (221 lb).   Weight management plan: Discussed healthy diet and exercise guidelines            Return in about 6 years (around 6/18/2025).    Odalis Carroll PA-C  [facility]    "

## 2019-07-21 ENCOUNTER — MYC MEDICAL ADVICE (OUTPATIENT)
Dept: FAMILY MEDICINE | Facility: CLINIC | Age: 44
End: 2019-07-21

## 2019-09-30 ENCOUNTER — DOCUMENTATION ONLY (OUTPATIENT)
Dept: LAB | Facility: CLINIC | Age: 44
End: 2019-09-30

## 2019-09-30 NOTE — PROGRESS NOTES
On 19, we sent an over due lab letter to this patient and he has not made an appt. The tests are now  and have been canceled. If you would like him to return to the clinic for a lab only appt, please have your team contact him and place new Future orders.    Thank you, Skylar Murry MLT

## 2019-11-07 ENCOUNTER — HEALTH MAINTENANCE LETTER (OUTPATIENT)
Age: 44
End: 2019-11-07

## 2020-01-29 DIAGNOSIS — I10 BENIGN ESSENTIAL HYPERTENSION: ICD-10-CM

## 2020-01-29 NOTE — TELEPHONE ENCOUNTER
"Requested Prescriptions   Pending Prescriptions Disp Refills     hydrochlorothiazide (HYDRODIURIL) 25 MG tablet [Pharmacy Med Name: HYDROCHLOROTHIAZIDE 25MG TABLETS]  Last Written Prescription Date:  06/18/19  Last Fill Quantity: 90,  # refills: 1   Last Office Visit with [facility] or [facility] prescribing provider:  06/18/19-Mary   Future Office Visit:    30 tablet      Sig: TAKE ONE TABLET BY MOUTH ONCE DAILY       Diuretics (Including Combos) Protocol Passed - 1/29/2020  8:33 AM        Passed - Blood pressure under 140/90 in past 12 months     BP Readings from Last 3 Encounters:   06/18/19 130/89   01/16/19 (!) 146/94   04/06/18 146/74                 Passed - Recent (12 mo) or future (30 days) visit within the authorizing provider's specialty     Patient has had an office visit with the authorizing provider or a provider within the authorizing providers department within the previous 12 mos or has a future within next 30 days. See \"Patient Info\" tab in inbasket, or \"Choose Columns\" in Meds & Orders section of the refill encounter.              Passed - Medication is active on med list        Passed - Patient is age 18 or older        Passed - Normal serum creatinine on file in past 12 months     Recent Labs   Lab Test 06/18/19  1122   CR 0.99              Passed - Normal serum potassium on file in past 12 months     Recent Labs   Lab Test 06/18/19  1122   POTASSIUM 4.1                    Passed - Normal serum sodium on file in past 12 months     Recent Labs   Lab Test 06/18/19  1122                 losartan (COZAAR) 50 MG tablet [Pharmacy Med Name: LOSARTAN 50MG TABLETS]  Last Written Prescription Date:  06/18/19  Last Fill Quantity: 90,  # refills: 1   Last Office Visit with [facility], [facility] or [facility] prescribing provider:  06/18/19-Carly   Future Office Visit:    30 tablet      Sig: TAKE ONE TABLET BY MOUTH ONCE DAILY       Angiotensin-II Receptors Passed - 1/29/2020  8:33 AM        Passed - Last blood " "pressure under 140/90 in past 12 months     BP Readings from Last 3 Encounters:   06/18/19 130/89   01/16/19 (!) 146/94   04/06/18 146/74                 Passed - Recent (12 mo) or future (30 days) visit within the authorizing provider's specialty     Patient has had an office visit with the authorizing provider or a provider within the authorizing providers department within the previous 12 mos or has a future within next 30 days. See \"Patient Info\" tab in inbasket, or \"Choose Columns\" in Meds & Orders section of the refill encounter.              Passed - Medication is active on med list        Passed - Patient is age 18 or older        Passed - Normal serum creatinine on file in past 12 months     Recent Labs   Lab Test 06/18/19  1122   CR 0.99             Passed - Normal serum potassium on file in past 12 months     Recent Labs   Lab Test 06/18/19  1122   POTASSIUM 4.1                      "

## 2020-02-03 RX ORDER — LOSARTAN POTASSIUM 50 MG/1
TABLET ORAL
Qty: 90 TABLET | Refills: 0 | Status: SHIPPED | OUTPATIENT
Start: 2020-02-03 | End: 2020-07-20

## 2020-02-03 RX ORDER — HYDROCHLOROTHIAZIDE 25 MG/1
TABLET ORAL
Qty: 90 TABLET | Refills: 0 | Status: SHIPPED | OUTPATIENT
Start: 2020-02-03 | End: 2020-07-20

## 2020-02-03 NOTE — TELEPHONE ENCOUNTER
Prescription approved per G Refill Protocol.    Luci Lara RN  [facility]

## 2020-05-31 ENCOUNTER — VIRTUAL VISIT (OUTPATIENT)
Dept: FAMILY MEDICINE | Facility: OTHER | Age: 45
End: 2020-05-31

## 2020-05-31 DIAGNOSIS — Z20.822 SUSPECTED COVID-19 VIRUS INFECTION: Primary | ICD-10-CM

## 2020-05-31 PROCEDURE — 87635 SARS-COV-2 COVID-19 AMP PRB: CPT | Mod: 90 | Performed by: FAMILY MEDICINE

## 2020-05-31 PROCEDURE — 99000 SPECIMEN HANDLING OFFICE-LAB: CPT | Performed by: FAMILY MEDICINE

## 2020-05-31 NOTE — PROGRESS NOTES
"Date: 2020 10:29:50  Clinician: Mechelle Watkins  Clinician NPI: [id number]  Patient: delon chou  Patient : 1975  Patient Address: [address] [place] [id number]  Patient Phone: [phone]  Visit Protocol: URI  Patient Summary:  delon is a 44 year old ( : 1975 ) male who initiated a Visit for COVID-19 (Coronavirus) evaluation and screening. When asked the question \"Please sign me up to receive news, health information and promotions. \", delon responded \"No\".    delon states his symptoms started 1-2 days ago.   His symptoms consist of malaise, myalgia, and chills. delon also feels feverish.   Symptom details   Temperature: His current temperature is 100.2 degrees Fahrenheit. delon has had a temperature over 100 degrees Fahrenheit for 1-2 days.    delon denies having wheezing, nausea, teeth pain, ageusia, diarrhea, sore throat, anosmia, facial pain or pressure, cough, nasal congestion, vomiting, rhinitis, ear pain, and headache. He also denies having recent facial or sinus surgery in the past 60 days and taking antibiotic medication for the symptoms. He is not experiencing dyspnea.   Precipitating events  He has not recently been exposed to someone with influenza. delon has not been in close contact with any high risk individuals.   Pertinent COVID-19 (Coronavirus) information  In the past 14 days, delon has not worked in a congregate living setting.   He does not work or volunteer as healthcare worker or a  and does not work or volunteer in a healthcare facility.   delon also has not lived in a congregate living setting in the past 14 days. He does not live with a healthcare worker.   delon has not had a close contact with a laboratory-confirmed COVID-19 patient within 14 days of symptom onset.   Pertinent medical history  delon needs a return to work/school note.   Weight: 220 lbs   delon smokes or uses smokeless tobacco.   Weight: 220 lbs    MEDICATIONS: " "hydrochlorothiazide oral, losartan oral, ALLERGIES: NKDA  Clinician Response:  Dear delon,      Your symptoms show that you may have coronavirus (COVID-19). This illness can cause fever, cough and trouble breathing. Many people get a mild case and get better on their own. Some people can get very sick.  What should I do?  We would like to test you for this virus. This will be a curbside test done outside the clinic.  Please call [phone] to schedule your visit. Explain that you were referred by [facility] to have a COVID-19 test. Be ready to share your [facility] visit ID number.  Starting now:  Stay at least 6 feet away from others. (If someone will drive you to your test, stay in the backseat, as far away from the  as you can.)   Don't go to work, school or anywhere else. When it's time for your test, go straight to the testing site. Don't make any stops on the way there or back.   Wash your hands and face often. Use soap and water.   Cover your mouth and nose with a mask, tissue or washcloth.   Don't touch anyone. No hugging, kissing or handshakes.  While at home   Stay home and away from others (self-isolate) until:  You've had no fever---and no medicine that reduces fever---for 3 full days (72 hours). And...  Your other symptoms have gotten better. For example, your cough or breathing has improved. And...  At least 10 days have passed since your symptoms started.  During this time:  Stay in your own room (and use your own bathroom), if you can.  Don't go to work, school or anywhere else.  Stay away from others in your home. No hugging, kissing or shaking hands.  Don't let anyone visit.  Cover your mouth and nose with a mask, tissue or washcloth to avoid spreading germs.  Clean \"high touch\" surfaces often (doorknobs, counters, handles, etc.). Use a household cleaning spray or wipes.  Wash your hands and face often. Use soap and water.  How can I take care of myself?  1. Get lots of rest. Drink extra fluids " (unless your doctor has told you not to).  2. Take Tylenol (acetaminophen) for fever or pain. If you have liver or kidney problems, ask your family doctor if it's okay to take Tylenol.  Adults can take either:   650 mg (two 325 mg pills) every 4 to 6 hours, or...  1,000 mg (two 500 mg pills) every 8 hours as needed.   Note: Don't take more than 3,000 mg in one day.   Acetaminophen is found in many medicines (both prescribed and over-the-counter medicines). Read all labels to be sure you don't take too much.   For children, check the Tylenol bottle for the right dose. The dose is based on the child's age or weight.  3. If you have other health problems (like cancer, heart failure, an organ transplant or severe kidney disease): Call your specialty clinic if you don't feel better in the next 2 days.  4. Know when to call 911: If your breathing is so bad that it keeps you from doing normal activities, call 911 or go to the emergency room. Tell them that you've been staying home and may have COVID-19.  5. Sign up for [employer]. We know it's scary to hear that you might have COVID-19. We want to track your symptoms to make sure you're okay over the next 2 weeks. Please look for an email from [employer]---this is a free, online program that we'll use to keep in touch. To sign up, follow the link in the email. Learn more at [email or web address].  6. The following will serve as your written order for this Covid Test ordered by me for the indication of suspected Covid [Z20.828]: The test will be ordered in [employer], our electronic health record after you are scheduled and will show as ordered and authorized by Neil Hu MD   Order: Covid-19 (Coronavirus) PCR for SYMPTOMATIC testing from [facility]  Where can I get more information?  To learn more about COVID-19 and how to care for yourself at home, please visit the CDC website at https://www.cdc.gov/coronavirus/2019-ncov/about/steps-when-sick.html.  For more about  your care at [facility], please visit [email or web address]  If you'd like to be part of a COVID-19 clinical trial (research study) at the [facility], go to [email or web address] for details.    Diagnosis: Other malaise  Diagnosis ICD: R53.81

## 2020-06-01 LAB
SARS-COV-2 RNA SPEC QL NAA+PROBE: NOT DETECTED
SPECIMEN SOURCE: NORMAL

## 2020-06-25 NOTE — PROGRESS NOTES
Notes recorded by AVELINO Frederick on 6/22/2020 at 12:08 PM CDT  Mild decrease in kidney function stable. Other labs good. Calcium was a little high. Will forward to pcp as FYI she can follow as she deems necessary.   SUBJECTIVE:                                                    Franklin Mullen is a 43 year old male who presents to clinic today for the following health issues:  New patient to me.     Hypertension Follow-up  Answers for HPI/ROS submitted by the patient on 1/16/2019   Chronic problems general questions HPI Form  Outpatient blood pressures: are being checked  Dietary sodium intake:: Low salt diet  Blood pressures checked at: Home      Amount of exercise or physical activity: started increasing a couple weeks ago - 2 times weekly     Problems taking medications regularly: misses doses - has been out since Sunday     Medication side effects: none  Diet: low salt, eating healthier, just started exercising 3 wks ago.   States father had MI agd 39 but was a heaver smoker.     Also:   ED- for the last 2 yrs. Feels like it his in his mind with cause of loss of function.   Recently went through a divorce and very stressful job. He state he and he ex wife are trying to work things out.     Problem list and histories reviewed & adjusted, as indicated.  Additional history: as documented    Patient Active Problem List   Diagnosis     Hyperlipidemia with target LDL less than 130     Testicular/scrotal pain     Family history of premature coronary artery disease     Benign essential hypertension     Erectile dysfunction, unspecified erectile dysfunction type     Past Surgical History:   Procedure Laterality Date     VASECTOMY  6/23/2005       Social History     Tobacco Use     Smoking status: Never Smoker     Smokeless tobacco: Current User     Types: Chew   Substance Use Topics     Alcohol use: Yes     Alcohol/week: 0.0 oz     Comment: occ.more in summer time-in the summer time a few beers a few times a week.       Family History   Problem Relation Age of Onset     Hypertension Father      Heart Disease Father 39        MI, stents age 50, was smoker     Diabetes Father         type 2     Hypertension Paternal Grandmother       "Cerebrovascular Disease Paternal Grandmother         in her 60's, smoker     Thyroid Disease No family hx of      Osteoporosis No family hx of      Genetic Disorder No family hx of      Prostate Cancer No family hx of      Colon Cancer No family hx of          Current Outpatient Medications   Medication Sig Dispense Refill     atorvastatin (LIPITOR) 20 MG tablet Take 1 tablet (20 mg) by mouth daily 90 tablet 3     hydrochlorothiazide (HYDRODIURIL) 25 MG tablet Take 1 tablet (25 mg) by mouth daily 90 tablet 0     losartan (COZAAR) 50 MG tablet Take 1 tablet (50 mg) by mouth daily 90 tablet 0     sildenafil (REVATIO) 20 MG tablet Take 1-5 tablets ( mg) by mouth daily as needed (.) Take 1 hr prior to sexual activity 90 tablet 0     Allergies   Allergen Reactions     Lisinopril Cough     Norvasc [Amlodipine] Other (See Comments)     headaches     BP Readings from Last 3 Encounters:   01/16/19 (!) 146/94   04/06/18 146/74   01/29/18 150/90    Wt Readings from Last 3 Encounters:   01/16/19 93 kg (205 lb)   04/06/18 (P) 100.1 kg (220 lb 9.6 oz)   01/29/18 100.7 kg (222 lb)                  Labs reviewed in EPIC    ROS:  Constitutional, HEENT, cardiovascular, pulmonary, gi and gu systems are negative, except as otherwise noted.    OBJECTIVE:     BP (!) 146/94   Pulse 73   Temp 97.4  F (36.3  C) (Oral)   Resp 16   Ht 1.778 m (5' 10\")   Wt 93 kg (205 lb)   SpO2 98%   BMI 29.41 kg/m    Body mass index is 29.41 kg/m .  GENERAL: healthy, alert and no distress  NECK: no adenopathy, no asymmetry, masses, or scars and thyroid normal to palpation  RESP: lungs clear to auscultation - no rales, rhonchi or wheezes  CV: regular rate and rhythm, normal S1 S2, no S3 or S4, no murmur, click or rub, no peripheral edema and peripheral pulses strong    Diagnostic Test Results:  No results found for this or any previous visit (from the past 24 hour(s)).    ASSESSMENT/PLAN:       ICD-10-CM    1. Benign essential hypertension I10 " hydrochlorothiazide (HYDRODIURIL) 25 MG tablet     Lipid panel reflex to direct LDL Fasting     Basic metabolic panel     losartan (COZAAR) 50 MG tablet   2. Erectile dysfunction, unspecified erectile dysfunction type N52.9 TSH with free T4 reflex     CBC with platelets differential     sildenafil (REVATIO) 20 MG tablet   3. Family history of premature coronary artery disease Z82.49 atorvastatin (LIPITOR) 20 MG tablet   1. Will add cozaar 50mg daily with hydrochlorothiazide. warning signs discussed. side effects discussed  Recheck 2 wks with pharmacy and fasting labs then will start cholesterol.   2. Start sildinafil. warning signs discussed. side effects discussed  3. Start lipitor based on family history and for prevention. warning signs discussed. side effects discussed  Recheck blood pressure in 6 months if at goal with recheck in 2 wks.     Karthik Montana PA-C  [facility]

## 2020-07-16 ENCOUNTER — TELEPHONE (OUTPATIENT)
Dept: FAMILY MEDICINE | Facility: CLINIC | Age: 45
End: 2020-07-16

## 2020-07-16 DIAGNOSIS — I10 BENIGN ESSENTIAL HYPERTENSION: ICD-10-CM

## 2020-07-16 NOTE — LETTER
July 30, 2020      Franklin Mullen  [address] [address] [facility] [address]        Dear ens,      At [facility] we care about your health and are committed to providing quality patient care. Regular appointments are a vital part of the care and management of your health and can help prevent many of the complications that can occur.       It has come to our attention that you have been given a one time refill of hydrochlorothiazide and losartan and that you are due for a visit with your provider for further refills.  Please call [facility] at [phone] or use [employer] to schedule your appointment.       Sincerely,   [facility] Care Team

## 2020-07-20 RX ORDER — HYDROCHLOROTHIAZIDE 25 MG/1
TABLET ORAL
Qty: 90 TABLET | Refills: 0 | Status: SHIPPED | OUTPATIENT
Start: 2020-07-20 | End: 2020-09-04

## 2020-07-20 RX ORDER — LOSARTAN POTASSIUM 50 MG/1
TABLET ORAL
Qty: 90 TABLET | Refills: 0 | Status: SHIPPED | OUTPATIENT
Start: 2020-07-20 | End: 2020-09-04

## 2020-07-20 NOTE — TELEPHONE ENCOUNTER
Team to please call patient and schedule medication recheck appointment. Mitzy refill provided. Thank you.        Charity Harrison RN, BSN, PHN

## 2020-07-22 NOTE — TELEPHONE ENCOUNTER
This writer attempted to contact patient on 07/22/20      Reason for call need appointment and left detailed message.      If patient calls back:   Schedule bp visit with provider    Message sent via [employer].    Zoraida Hu MA

## 2020-07-30 NOTE — TELEPHONE ENCOUNTER
Reminder letter to schedule needed appt for further refills mailed to pt's home address.      Cristy BURNETT (R))

## 2020-09-04 ENCOUNTER — VIRTUAL VISIT (OUTPATIENT)
Dept: FAMILY MEDICINE | Facility: CLINIC | Age: 45
End: 2020-09-04
Payer: COMMERCIAL

## 2020-09-04 DIAGNOSIS — I10 BENIGN ESSENTIAL HYPERTENSION: ICD-10-CM

## 2020-09-04 DIAGNOSIS — Z82.49 FAMILY HISTORY OF PREMATURE CORONARY ARTERY DISEASE: Primary | ICD-10-CM

## 2020-09-04 PROCEDURE — 99213 OFFICE O/P EST LOW 20 MIN: CPT | Mod: TEL | Performed by: PHYSICIAN ASSISTANT

## 2020-09-04 RX ORDER — HYDROCHLOROTHIAZIDE 25 MG/1
25 TABLET ORAL DAILY
Qty: 90 TABLET | Refills: 1 | Status: SHIPPED | OUTPATIENT
Start: 2020-09-04 | End: 2021-02-09

## 2020-09-04 RX ORDER — LOSARTAN POTASSIUM 50 MG/1
50 TABLET ORAL DAILY
Qty: 90 TABLET | Refills: 1 | Status: SHIPPED | OUTPATIENT
Start: 2020-09-04 | End: 2021-02-09

## 2020-09-04 NOTE — Clinical Note
Please help Franklin make a future fasting lab and ancillary blood pressure check in the next week or 2.    Karthik Montana PA-C

## 2020-09-08 NOTE — PROGRESS NOTES
Called the patient @ [phone]. I left a VM stating I was calling per CARSON Montana to schedule a fasting lab appt. And a Blood pressure check for him on the ancillary schedule. Gave my direct line # [phone] and my hrs.for today.  Annette Norman TC

## 2020-09-09 ENCOUNTER — TELEPHONE (OUTPATIENT)
Dept: FAMILY MEDICINE | Facility: CLINIC | Age: 45
End: 2020-09-09

## 2020-09-09 NOTE — TELEPHONE ENCOUNTER
Progress Notes   Annette Norman (Non-Clinical)      Called the patient @ [phone]. I left a VM stating I was calling per CARSON Montana to schedule a fasting lab appt. And a Blood pressure check for him on the ancillary schedule. Gave my direct line # [phone] and my hrs.for today.  Annette Norman, TC

## 2020-09-09 NOTE — TELEPHONE ENCOUNTER
Karthik Montana PA-C [facility]               Please help Franklin make a future fasting lab and ancillary blood pressure check in the next week or 2.     Karthik Montana PA-C

## 2020-11-05 DIAGNOSIS — I10 BENIGN ESSENTIAL HYPERTENSION: ICD-10-CM

## 2020-11-06 RX ORDER — LOSARTAN POTASSIUM 50 MG/1
TABLET ORAL
Qty: 90 TABLET | Refills: 1 | OUTPATIENT
Start: 2020-11-06

## 2020-11-06 NOTE — TELEPHONE ENCOUNTER
Should have refills on file at pharmacy. Patient to contact pharmacy.     Donna Ivy RN  [facility]

## 2020-11-29 ENCOUNTER — HEALTH MAINTENANCE LETTER (OUTPATIENT)
Age: 45
End: 2020-11-29

## 2020-12-04 DIAGNOSIS — I10 BENIGN ESSENTIAL HYPERTENSION: ICD-10-CM

## 2020-12-04 RX ORDER — LOSARTAN POTASSIUM 50 MG/1
TABLET ORAL
Qty: 90 TABLET | Refills: 1 | OUTPATIENT
Start: 2020-12-04

## 2020-12-04 NOTE — TELEPHONE ENCOUNTER
Should have refills on file at pharmacy. Patient to contact pharmacy.     Donna Ivy RN  [facility]

## 2021-02-09 ENCOUNTER — OFFICE VISIT (OUTPATIENT)
Dept: FAMILY MEDICINE | Facility: CLINIC | Age: 46
End: 2021-02-09
Payer: COMMERCIAL

## 2021-02-09 ENCOUNTER — NURSE TRIAGE (OUTPATIENT)
Dept: FAMILY MEDICINE | Facility: CLINIC | Age: 46
End: 2021-02-09

## 2021-02-09 VITALS
WEIGHT: 227 LBS | HEART RATE: 82 BPM | TEMPERATURE: 97.5 F | RESPIRATION RATE: 18 BRPM | OXYGEN SATURATION: 97 % | HEIGHT: 70 IN | DIASTOLIC BLOOD PRESSURE: 120 MMHG | SYSTOLIC BLOOD PRESSURE: 188 MMHG | BODY MASS INDEX: 32.5 KG/M2

## 2021-02-09 DIAGNOSIS — Z82.49 FAMILY HISTORY OF PREMATURE CORONARY ARTERY DISEASE: ICD-10-CM

## 2021-02-09 DIAGNOSIS — I10 BENIGN ESSENTIAL HYPERTENSION: Primary | ICD-10-CM

## 2021-02-09 PROCEDURE — 99213 OFFICE O/P EST LOW 20 MIN: CPT | Performed by: PHYSICIAN ASSISTANT

## 2021-02-09 RX ORDER — LOSARTAN POTASSIUM 50 MG/1
50 TABLET ORAL DAILY
Qty: 90 TABLET | Refills: 1 | Status: SHIPPED | OUTPATIENT
Start: 2021-02-09 | End: 2021-02-24

## 2021-02-09 RX ORDER — HYDROCHLOROTHIAZIDE 25 MG/1
25 TABLET ORAL DAILY
Qty: 90 TABLET | Refills: 1 | Status: SHIPPED | OUTPATIENT
Start: 2021-02-09 | End: 2021-10-12 | Stop reason: ALTCHOICE

## 2021-02-09 RX ORDER — ATORVASTATIN CALCIUM 40 MG/1
40 TABLET, FILM COATED ORAL DAILY
Qty: 90 TABLET | Refills: 1 | Status: SHIPPED | OUTPATIENT
Start: 2021-02-09 | End: 2021-10-12 | Stop reason: ALTCHOICE

## 2021-02-09 ASSESSMENT — MIFFLIN-ST. JEOR: SCORE: 1915.92

## 2021-02-09 NOTE — PROGRESS NOTES
"  Assessment & Plan       ICD-10-CM    1. Benign essential hypertension  I10 losartan (COZAAR) 50 MG tablet     hydrochlorothiazide (HYDRODIURIL) 25 MG tablet     atorvastatin (LIPITOR) 40 MG tablet   2. Family history of premature coronary artery disease  Z82.49 atorvastatin (LIPITOR) 40 MG tablet   Talk to patient about his concerns of.  We will get him back on his losartan 50 mg a day.  We will also get him on Lipitor 40 mg a day due to his family history of coronary artery disease in his father in his early 40s.  We talked about warning signs and side effects.  We will recheck his blood pressure in 2 weeks with fasting labs.    BMI:   Estimated body mass index is 32.57 kg/m  as calculated from the following:    Height as of this encounter: 1.778 m (5' 10\").    Weight as of this encounter: 103 kg (227 lb).   Weight management plan: Discussed healthy diet and exercise guidelines      Return in about 2 weeks (around 2/23/2021) for recheck.    ANTONIA Garcia [facility]     Franklin is a 45 year old who presents to clinic today for the following health issues   HPI       Hypertension Follow-up      Do you check your blood pressure regularly outside of the clinic? Yes     Are you following a low salt diet? No    Are your blood pressures ever more than 140 on the top number (systolic) OR more   than 90 on the bottom number (diastolic), for example 140/90? Yes   Has been off Losartan for a few months, blood pressure was been running about 174/110  No chest pain or short of breath. No headache or dizziness.      Review of Systems   Constitutional, HEENT, cardiovascular, pulmonary, gi and gu systems are negative, except as otherwise noted.      Objective    BP (!) 188/120   Pulse 82   Temp 97.5  F (36.4  C) (Tympanic)   Resp 18   Ht 1.778 m (5' 10\")   Wt 103 kg (227 lb)   SpO2 97%   BMI 32.57 kg/m    Body mass index is 32.57 kg/m .  Physical Exam   GENERAL: healthy, " alert and no distress  RESP: lungs clear to auscultation - no rales, rhonchi or wheezes  CV: regular rate and rhythm, normal S1 S2, no S3 or S4, no murmur, click or rub, no peripheral edema and peripheral pulses strong  MS: no gross musculoskeletal defects noted, no edema

## 2021-02-09 NOTE — TELEPHONE ENCOUNTER
"Patient calls with concerns of elevated BP readings.  States he usually runs   140s/mid 90s but last evening he was 174/110 and this morning 174/109 on a wrist monitor.  He had visual disturbance last evening, flashing in left eye lasting about 1 minute.  Denies vision changes today.  He also denies headaches, dizziness, chest pain, breathing difficulty.    Patient has been out of Losartan for approximately 3 months.  States he called clinic a couple of times for refills but states he couldn't get a refill.  He continues to take his hydrochlorothiazide.  Reason for denial of Losartan refill is \"has refills yet at pharmacy\" but patient states he does not.      Patient states he has a high stress job, drinks a lot of coffee.  His father recently had a stroke.  Advised patient per protocol that he needs to be evaluated in ER now for high BP.  Patient states before he was transferred to  he scheduled an appt with primary care provider for 11:40 a.m. today.  Patient states he will think about going to ER but at this time doesn't know if he will go to ER or just go to 11:40 a.m. office visit today with Karthik Davis R.N.       Additional Information    Negative: Pregnant > 20 weeks or postpartum (< 6 weeks after delivery) and new hand or face swelling    Negative: Pregnant > 20 weeks and BP > 140/90    Systolic BP >= 160 OR Diastolic >= 100, and any cardiac or neurologic symptoms (e.g., chest pain, difficulty breathing, unsteady gait, blurred vision)     Vision disturbance last evening, none today    Negative: Patient sounds very sick or weak to the triager    Negative: BP Systolic BP >= 140 OR Diastolic >= 90 and postpartum (from 0 to 6 weeks after delivery)    Answer Assessment - Initial Assessment Questions  1. BLOOD PRESSURE: \"What is the blood pressure?\" \"Did you take at least two measurements 5 minutes apart?\"      Last evening 174/110 and this morning 174/109  2. ONSET: \"When did you take your " "blood pressure?\"      7 a.m. today  3. HOW: \"How did you obtain the blood pressure?\" (e.g., visiting nurse, automatic home BP monitor)      Wrist monitor at home  4. HISTORY: \"Do you have a history of high blood pressure?\"      yes  5. MEDICATIONS: \"Are you taking any medications for blood pressure?\" \"Have you missed any doses recently?\"      Yes, out of Losartan x3 months  6. OTHER SYMPTOMS: \"Do you have any symptoms?\" (e.g., headache, chest pain, blurred vision, difficulty breathing, weakness)      Denies headache, dizziness, difficulty breathing or weakness.  Last evening had flashes of light in left eye for about 1 minute.    7. PREGNANCY: \"Is there any chance you are pregnant?\" \"When was your last menstrual period?\"      NA    Protocols used: HIGH BLOOD PRESSURE-A-OH      "

## 2021-02-23 NOTE — PROGRESS NOTES
"    Assessment & Plan     ICD-10-CM    1. Benign essential hypertension  I10 losartan (COZAAR) 50 MG tablet     **Comprehensive metabolic panel FUTURE anytime     Lipid panel reflex to direct LDL Fasting   2. Family history of premature coronary artery disease  Z82.49 **Comprehensive metabolic panel FUTURE anytime     Lipid panel reflex to direct LDL Fasting   Patient but his concerns at this point will increase his Cozaar to 100 mg a day we will recheck his blood pressure in about 2 weeks.  Warning signs side effects were discussed.  We talked about diet and exercise.  Regarding his muscle cramps he states is tolerable and wants to continue with the cholesterol medicine.    Return in about 2 weeks (around 3/10/2021) for BP Recheck.    ANTONIA Garcia [facility]   Franklin is a 45 year old who presents for the following health issues     HPI       Hypertension Follow-up      Do you check your blood pressure regularly outside of the clinic? Yes     Are you following a low salt diet? No    Are your blood pressures ever more than 140 on the top number (systolic) OR more   than 90 on the bottom number (diastolic), for example 140/90? Yes    Having muscle cramps since starting cholesterol medication but have been getting better.       Review of Systems   Constitutional, HEENT, cardiovascular, pulmonary, gi and gu systems are negative, except as otherwise noted.      Objective    BP (!) 142/98   Pulse 79   Resp 16   Ht 1.778 m (5' 10\")   Wt 103 kg (227 lb)   SpO2 99%   BMI 32.57 kg/m    Body mass index is 32.57 kg/m .  Physical Exam   GENERAL: healthy, alert and no distress  RESP: lungs clear to auscultation - no rales, rhonchi or wheezes  CV: regular rate and rhythm, normal S1 S2, no S3 or S4, no murmur, click or rub, no peripheral edema and peripheral pulses strong  MS: no gross musculoskeletal defects noted, no edema    Unresulted Labs Ordered in the Past 30 Days of " this Admission     Date and Time Order Name Status Description    2/24/2021 0734 LIPID REFLEX TO DIRECT LDL PANEL In process     2/24/2021 0734 COMPREHENSIVE METABOLIC PANEL In process

## 2021-02-24 ENCOUNTER — OFFICE VISIT (OUTPATIENT)
Dept: FAMILY MEDICINE | Facility: CLINIC | Age: 46
End: 2021-02-24
Payer: COMMERCIAL

## 2021-02-24 VITALS
SYSTOLIC BLOOD PRESSURE: 142 MMHG | OXYGEN SATURATION: 99 % | DIASTOLIC BLOOD PRESSURE: 98 MMHG | WEIGHT: 227 LBS | RESPIRATION RATE: 16 BRPM | HEIGHT: 70 IN | HEART RATE: 79 BPM | BODY MASS INDEX: 32.5 KG/M2

## 2021-02-24 DIAGNOSIS — Z82.49 FAMILY HISTORY OF PREMATURE CORONARY ARTERY DISEASE: ICD-10-CM

## 2021-02-24 DIAGNOSIS — I10 BENIGN ESSENTIAL HYPERTENSION: ICD-10-CM

## 2021-02-24 LAB
ALBUMIN SERPL-MCNC: 3.9 G/DL (ref 3.4–5)
ALP SERPL-CCNC: 65 U/L (ref 40–150)
ALT SERPL W P-5'-P-CCNC: 63 U/L (ref 0–70)
ANION GAP SERPL CALCULATED.3IONS-SCNC: 3 MMOL/L (ref 3–14)
AST SERPL W P-5'-P-CCNC: 26 U/L (ref 0–45)
BILIRUB SERPL-MCNC: 0.5 MG/DL (ref 0.2–1.3)
BUN SERPL-MCNC: 21 MG/DL (ref 7–30)
CALCIUM SERPL-MCNC: 9.1 MG/DL (ref 8.5–10.1)
CHLORIDE SERPL-SCNC: 108 MMOL/L (ref 94–109)
CHOLEST SERPL-MCNC: 163 MG/DL
CO2 SERPL-SCNC: 30 MMOL/L (ref 20–32)
CREAT SERPL-MCNC: 1.01 MG/DL (ref 0.66–1.25)
GFR SERPL CREATININE-BSD FRML MDRD: 89 ML/MIN/{1.73_M2}
GLUCOSE SERPL-MCNC: 97 MG/DL (ref 70–99)
HDLC SERPL-MCNC: 37 MG/DL
LDLC SERPL CALC-MCNC: 78 MG/DL
NONHDLC SERPL-MCNC: 126 MG/DL
POTASSIUM SERPL-SCNC: 3.9 MMOL/L (ref 3.4–5.3)
PROT SERPL-MCNC: 7.5 G/DL (ref 6.8–8.8)
SODIUM SERPL-SCNC: 141 MMOL/L (ref 133–144)
TRIGL SERPL-MCNC: 242 MG/DL

## 2021-02-24 PROCEDURE — 80061 LIPID PANEL: CPT | Performed by: PHYSICIAN ASSISTANT

## 2021-02-24 PROCEDURE — 80053 COMPREHEN METABOLIC PANEL: CPT | Performed by: PHYSICIAN ASSISTANT

## 2021-02-24 PROCEDURE — 36415 COLL VENOUS BLD VENIPUNCTURE: CPT | Performed by: PHYSICIAN ASSISTANT

## 2021-02-24 PROCEDURE — 99213 OFFICE O/P EST LOW 20 MIN: CPT | Performed by: PHYSICIAN ASSISTANT

## 2021-02-24 RX ORDER — LOSARTAN POTASSIUM 50 MG/1
100 TABLET ORAL DAILY
Qty: 90 TABLET | Refills: 1
Start: 2021-02-24 | End: 2021-07-08

## 2021-02-24 ASSESSMENT — MIFFLIN-ST. JEOR: SCORE: 1915.92

## 2021-04-07 NOTE — PROGRESS NOTES
"    Assessment & Plan       ICD-10-CM    1. Benign essential hypertension  I10 NIFEdipine ER (ADALAT CC) 30 MG 24 hr tablet   Long discussion with patient about his concerns.  At this time I would have him take all his medication before he goes to bed.  We talked about regular meals throughout the day and good hydration.  He is can try this for couple weeks if his symptoms come back we will have him stop the losartan and start nifedipine and come back in 2 to 4 weeks for recheck.  Warning signs of side effects were discussed.    Return in about 4 weeks (around 5/6/2021) for recheck.    ANTONIA Garcia [facility]   Franklin is a 45 year old who presents for the following health issues     HPI     Pt stopped taking Losartan due to stomach issues  - here to discuss alternative  He states he stopped his medicine for about 2 weeks now and he feels that his stomach aching has subsided.  He denies any nausea vomiting or diarrhea.  He does admit that he really does not eat much or drink much throughout the day.  He takes all of his medications in the morning.      Review of Systems   Constitutional, HEENT, cardiovascular, pulmonary, gi and gu systems are negative, except as otherwise noted.      Objective    BP (!) 170/110   Pulse 74   Resp 16   Ht 1.778 m (5' 10\")   Wt 103.9 kg (229 lb)   SpO2 100%   BMI 32.86 kg/m    Body mass index is 32.86 kg/m .  Physical Exam   GENERAL: healthy, alert and no distress  RESP: lungs clear to auscultation - no rales, rhonchi or wheezes  CV: regular rate and rhythm, normal S1 S2, no S3 or S4, no murmur, click or rub, no peripheral edema and peripheral pulses strong  MS: no gross musculoskeletal defects noted, no edema          "

## 2021-04-08 ENCOUNTER — OFFICE VISIT (OUTPATIENT)
Dept: FAMILY MEDICINE | Facility: CLINIC | Age: 46
End: 2021-04-08
Payer: COMMERCIAL

## 2021-04-08 VITALS
WEIGHT: 229 LBS | HEART RATE: 74 BPM | BODY MASS INDEX: 32.78 KG/M2 | SYSTOLIC BLOOD PRESSURE: 170 MMHG | HEIGHT: 70 IN | DIASTOLIC BLOOD PRESSURE: 110 MMHG | RESPIRATION RATE: 16 BRPM | OXYGEN SATURATION: 100 %

## 2021-04-08 DIAGNOSIS — I10 BENIGN ESSENTIAL HYPERTENSION: Primary | ICD-10-CM

## 2021-04-08 PROCEDURE — 99213 OFFICE O/P EST LOW 20 MIN: CPT | Performed by: PHYSICIAN ASSISTANT

## 2021-04-08 RX ORDER — NIFEDIPINE 30 MG
30 TABLET, EXTENDED RELEASE ORAL DAILY
Qty: 30 TABLET | Refills: 0 | Status: SHIPPED | OUTPATIENT
Start: 2021-04-08 | End: 2021-10-12 | Stop reason: ALTCHOICE

## 2021-04-08 ASSESSMENT — MIFFLIN-ST. JEOR: SCORE: 1924.99

## 2021-05-20 ENCOUNTER — MYC REFILL (OUTPATIENT)
Dept: FAMILY MEDICINE | Facility: CLINIC | Age: 46
End: 2021-05-20

## 2021-05-20 DIAGNOSIS — N52.9 ERECTILE DYSFUNCTION, UNSPECIFIED ERECTILE DYSFUNCTION TYPE: ICD-10-CM

## 2021-05-20 RX ORDER — SILDENAFIL CITRATE 20 MG/1
20-100 TABLET ORAL DAILY PRN
Qty: 90 TABLET | Refills: 0 | Status: SHIPPED | OUTPATIENT
Start: 2021-05-20 | End: 2021-11-08

## 2021-05-21 ENCOUNTER — TELEPHONE (OUTPATIENT)
Dept: FAMILY MEDICINE | Facility: CLINIC | Age: 46
End: 2021-05-21

## 2021-05-21 NOTE — TELEPHONE ENCOUNTER
Prior Authorization Retail Medication Request    Medication/Dose: sildenafil (REVATIO) 20 MG tablet  ICD code (if different than what is on RX):  Erectile dysfunction, unspecified erectile dysfunction type [N52.9]         Pharmacy Information (if different than what is on RX)  Name:  [facility]   Phone:  [phone]

## 2021-05-21 NOTE — TELEPHONE ENCOUNTER
Central Prior Authorization Team   Phone: [phone]    PA Initiation    Medication: sildenafil (REVATIO) 20 MG tablet  Insurance Company: [employer] - Phone [phone] Fax [phone]  Pharmacy Filling the Rx: [id number] [place] [address] [address] [facility] [address]  Filling Pharmacy Phone: [phone]  Filling Pharmacy Fax: [phone]  Start Date: 5/21/2021

## 2021-05-24 NOTE — TELEPHONE ENCOUNTER
"To Provider,  Please review message for the PA Department below and advise.    PA denied-   \"Information:  IF YOU WOULD LIKE TO APPEAL PLEASE SUPPLY PA TEAM WITH A LETTER OF MEDICAL NECESSITY WITH CLINICAL REASON.\"    Felicita Loco   "

## 2021-05-24 NOTE — TELEPHONE ENCOUNTER
PRIOR AUTHORIZATION DENIED    Medication: sildenafil (REVATIO) 20 MG-DENIED    Denial Date: 5/23/2021    Denial Rational: Erectile dysfunction, unspecified erectile dysfunction type [N52.9] IS NOT A COVERED INDICATION.  INSURANCE WILL ONLY COVER FOR PH.        Appeal Information:  IF YOU WOULD LIKE TO APPEAL PLEASE SUPPLY PA TEAM WITH A LETTER OF MEDICAL NECESSITY WITH CLINICAL REASON.

## 2021-07-08 ENCOUNTER — MYC MEDICAL ADVICE (OUTPATIENT)
Dept: FAMILY MEDICINE | Facility: CLINIC | Age: 46
End: 2021-07-08

## 2021-07-08 DIAGNOSIS — I10 BENIGN ESSENTIAL HYPERTENSION: ICD-10-CM

## 2021-07-09 RX ORDER — LOSARTAN POTASSIUM 50 MG/1
100 TABLET ORAL DAILY
Qty: 60 TABLET | Refills: 0 | Status: SHIPPED | OUTPATIENT
Start: 2021-07-09 | End: 2021-08-24

## 2021-07-09 NOTE — TELEPHONE ENCOUNTER
Needs to be seen to recheck abdominal pains and blood pressure check with new dosages and labs.     Karthik Montana PA-C

## 2021-08-24 DIAGNOSIS — I10 BENIGN ESSENTIAL HYPERTENSION: ICD-10-CM

## 2021-08-24 RX ORDER — LOSARTAN POTASSIUM 50 MG/1
100 TABLET ORAL DAILY
Qty: 60 TABLET | Refills: 0 | Status: SHIPPED | OUTPATIENT
Start: 2021-08-24 | End: 2021-10-12 | Stop reason: ALTCHOICE

## 2021-08-24 NOTE — TELEPHONE ENCOUNTER
Routing refill request to provider for review/approval because:  Mitzy given x1 and patient did not follow up, please advise    Virgie Arriaga BSN, RN

## 2021-08-24 NOTE — TELEPHONE ENCOUNTER
Reason for Call:  Medication or medication refill:    Do you use a [facility]?  Name of the pharmacy and phone number for the current request:  [facility]    Name of the medication requested: losartan (COZAAR) 50 MG tablet    Other request: Patient has an appointment for medication check on 9/8/21 but he is out of medication, asking for short term refill to supply him until appointment.     Can we leave a detailed message on this number? YES    Phone number patient can be reached at: Cell number on file:    Telephone Information:   Mobile [phone]       Best Time: any    Call taken on 8/24/2021 at 12:21 PM by Pablito Whitten

## 2021-09-25 ENCOUNTER — HEALTH MAINTENANCE LETTER (OUTPATIENT)
Age: 46
End: 2021-09-25

## 2021-10-12 ENCOUNTER — OFFICE VISIT (OUTPATIENT)
Dept: FAMILY MEDICINE | Facility: CLINIC | Age: 46
End: 2021-10-12
Payer: COMMERCIAL

## 2021-10-12 VITALS
OXYGEN SATURATION: 98 % | BODY MASS INDEX: 32.93 KG/M2 | DIASTOLIC BLOOD PRESSURE: 98 MMHG | WEIGHT: 230 LBS | HEART RATE: 75 BPM | HEIGHT: 70 IN | SYSTOLIC BLOOD PRESSURE: 150 MMHG

## 2021-10-12 DIAGNOSIS — I10 BENIGN ESSENTIAL HYPERTENSION: ICD-10-CM

## 2021-10-12 DIAGNOSIS — Z82.49 FAMILY HISTORY OF PREMATURE CORONARY ARTERY DISEASE: ICD-10-CM

## 2021-10-12 PROCEDURE — 99214 OFFICE O/P EST MOD 30 MIN: CPT | Performed by: PHYSICIAN ASSISTANT

## 2021-10-12 RX ORDER — LOSARTAN POTASSIUM 100 MG/1
100 TABLET ORAL DAILY
Qty: 90 TABLET | Refills: 0 | Status: SHIPPED | OUTPATIENT
Start: 2021-10-12 | End: 2022-03-04

## 2021-10-12 RX ORDER — NIFEDIPINE 30 MG
30 TABLET, EXTENDED RELEASE ORAL DAILY
Qty: 90 TABLET | Refills: 0 | Status: SHIPPED | OUTPATIENT
Start: 2021-10-12 | End: 2021-10-26

## 2021-10-12 RX ORDER — ATORVASTATIN CALCIUM 40 MG/1
40 TABLET, FILM COATED ORAL DAILY
Qty: 90 TABLET | Refills: 1 | Status: SHIPPED | OUTPATIENT
Start: 2021-10-12 | End: 2023-01-11

## 2021-10-12 RX ORDER — HYDROCHLOROTHIAZIDE 25 MG/1
25 TABLET ORAL DAILY
Qty: 90 TABLET | Refills: 0 | Status: SHIPPED | OUTPATIENT
Start: 2021-10-12 | End: 2022-02-21

## 2021-10-12 ASSESSMENT — MIFFLIN-ST. JEOR: SCORE: 1929.52

## 2021-10-12 NOTE — PROGRESS NOTES
"    Assessment & Plan       ICD-10-CM    1. Benign essential hypertension  I10 atorvastatin (LIPITOR) 40 MG tablet     NIFEdipine ER (ADALAT CC) 30 MG 24 hr tablet     hydrochlorothiazide (HYDRODIURIL) 25 MG tablet     losartan (COZAAR) 100 MG tablet   2. Family history of premature coronary artery disease  Z82.49 atorvastatin (LIPITOR) 40 MG tablet   Talk to patient about his concerns at this point we will get him back on all of his medications.  We will recheck his blood pressure in 2 weeks when he is on his medication to get a better assessment if the medicine is helping his blood pressure not.  We will recheck lab work at that time.  Warning signs and side effects were discussed.     Tobacco Cessation:   reports that he has never smoked. His smokeless tobacco use includes chew.  Tobacco Cessation Action Plan: Information offered: Patient not interested at this time    BMI:   Estimated body mass index is 33 kg/m  as calculated from the following:    Height as of this encounter: 1.778 m (5' 10\").    Weight as of this encounter: 104.3 kg (230 lb).   Weight management plan: Discussed healthy diet and exercise guidelines      Return in about 2 weeks (around 10/26/2021) for BP Recheck.    ANTONIA Garcia [facility]   Franklin is a 45 year old who presents for the following health issues     HPI     Hyperlipidemia Follow-Up      Are you regularly taking any medication or supplement to lower your cholesterol?   No    Are you having muscle aches or other side effects that you think could be caused by your cholesterol lowering medication?  No    Hypertension Follow-up      Do you check your blood pressure regularly outside of the clinic? No     Are you following a low salt diet? No    Are your blood pressures ever more than 140 on the top number (systolic) OR more   than 90 on the bottom number (diastolic), for example 140/90? Yes      How many servings of fruits and " "vegetables do you eat daily?  0-1    On average, how many sweetened beverages do you drink each day (Examples: soda, juice, sweet tea, etc.  Do NOT count diet or artificially sweetened beverages)?   3    How many days per week do you exercise enough to make your heart beat faster? 3 or less    How many minutes a day do you exercise enough to make your heart beat faster? 9 or less    How many days per week do you miss taking your medication? 0,  Normally 0 days but have been out of medication for the past 5 days.      Review of Systems   Constitutional, HEENT, cardiovascular, pulmonary, gi and gu systems are negative, except as otherwise noted.      Objective    BP (!) 150/98   Pulse 75   Ht 1.778 m (5' 10\")   Wt 104.3 kg (230 lb)   SpO2 98%   BMI 33.00 kg/m    Body mass index is 33 kg/m .  Physical Exam   GENERAL: healthy, alert and no distress  RESP: lungs clear to auscultation - no rales, rhonchi or wheezes  CV: regular rate and rhythm, normal S1 S2, no S3 or S4, no murmur, click or rub, no peripheral edema and peripheral pulses strong  MS: no gross musculoskeletal defects noted, no edema        "

## 2021-10-26 ENCOUNTER — MYC MEDICAL ADVICE (OUTPATIENT)
Dept: FAMILY MEDICINE | Facility: CLINIC | Age: 46
End: 2021-10-26

## 2021-10-26 ENCOUNTER — OFFICE VISIT (OUTPATIENT)
Dept: FAMILY MEDICINE | Facility: CLINIC | Age: 46
End: 2021-10-26
Payer: COMMERCIAL

## 2021-10-26 VITALS
TEMPERATURE: 97.5 F | DIASTOLIC BLOOD PRESSURE: 88 MMHG | WEIGHT: 228 LBS | OXYGEN SATURATION: 100 % | HEART RATE: 75 BPM | HEIGHT: 70 IN | BODY MASS INDEX: 32.64 KG/M2 | SYSTOLIC BLOOD PRESSURE: 135 MMHG | RESPIRATION RATE: 16 BRPM

## 2021-10-26 DIAGNOSIS — I10 BENIGN ESSENTIAL HYPERTENSION: Primary | ICD-10-CM

## 2021-10-26 LAB
ANION GAP SERPL CALCULATED.3IONS-SCNC: 7 MMOL/L (ref 3–14)
BUN SERPL-MCNC: 21 MG/DL (ref 7–30)
CALCIUM SERPL-MCNC: 9 MG/DL (ref 8.5–10.1)
CHLORIDE BLD-SCNC: 102 MMOL/L (ref 94–109)
CO2 SERPL-SCNC: 28 MMOL/L (ref 20–32)
CREAT SERPL-MCNC: 1.11 MG/DL (ref 0.66–1.25)
GFR SERPL CREATININE-BSD FRML MDRD: 79 ML/MIN/1.73M2
GLUCOSE BLD-MCNC: 87 MG/DL (ref 70–99)
POTASSIUM BLD-SCNC: 3.7 MMOL/L (ref 3.4–5.3)
SODIUM SERPL-SCNC: 137 MMOL/L (ref 133–144)

## 2021-10-26 PROCEDURE — 80048 BASIC METABOLIC PNL TOTAL CA: CPT | Performed by: PHYSICIAN ASSISTANT

## 2021-10-26 PROCEDURE — 36415 COLL VENOUS BLD VENIPUNCTURE: CPT | Performed by: PHYSICIAN ASSISTANT

## 2021-10-26 PROCEDURE — 99213 OFFICE O/P EST LOW 20 MIN: CPT | Performed by: PHYSICIAN ASSISTANT

## 2021-10-26 ASSESSMENT — PAIN SCALES - GENERAL: PAINLEVEL: NO PAIN (0)

## 2021-10-26 ASSESSMENT — MIFFLIN-ST. JEOR: SCORE: 1920.45

## 2021-10-26 NOTE — PROGRESS NOTES
"  Assessment & Plan       ICD-10-CM    1. Benign essential hypertension  I10 Basic metabolic panel  (Ca, Cl, CO2, Creat, Gluc, K, Na, BUN)     Basic metabolic panel  (Ca, Cl, CO2, Creat, Gluc, K, Na, BUN)   medical conditions are stable. meds refilled.  We will hold off on starting him on nifedipine at this time his blood pressure appears to be stable without it.  Work on Healthy diet and exercise. Getting heart rate elevated for 30 mins most days of week.  Recheck 6 months.     Return in about 6 months (around 4/26/2022) for recheck.    ANTONIA Garcia [facility]   Fraknlin is a 45 year old who presents for the following health issues   Blood pressure check done today now that he is back on all his blood pressure medications-he never started the nifedipine due to miscommunication.  HPI     Hypertension Follow-up      Do you check your blood pressure regularly outside of the clinic? No     Are you following a low salt diet? Yes    Are your blood pressures ever more than 140 on the top number (systolic) OR more   than 90 on the bottom number (diastolic), for example 140/90? No     Review of Systems   Constitutional, HEENT, cardiovascular, pulmonary, gi and gu systems are negative, except as otherwise noted.      Objective    /88   Pulse 75   Temp 97.5  F (36.4  C) (Tympanic)   Resp 16   Ht 1.778 m (5' 10\")   Wt 103.4 kg (228 lb)   SpO2 100%   BMI 32.71 kg/m    Body mass index is 32.71 kg/m .  Physical Exam   GENERAL: healthy, alert and no distress  NECK: no adenopathy, no asymmetry, masses, or scars and thyroid normal to palpation  RESP: lungs clear to auscultation - no rales, rhonchi or wheezes  CV: regular rate and rhythm, normal S1 S2, no S3 or S4, no murmur, click or rub, no peripheral edema and peripheral pulses strong  ABDOMEN: soft, nontender, no hepatosplenomegaly, no masses and bowel sounds normal  MS: no gross musculoskeletal defects noted, no " edema    Labs pending

## 2021-10-27 NOTE — RESULT ENCOUNTER NOTE
MrNorris Mullen,    All of your labs were normal/near normal for you.    Please contact the clinic if you have additional questions.  Thank you.    Sincerely,    Karthik Montana PA-C

## 2021-11-08 ENCOUNTER — MYC MEDICAL ADVICE (OUTPATIENT)
Dept: FAMILY MEDICINE | Facility: CLINIC | Age: 46
End: 2021-11-08
Payer: COMMERCIAL

## 2021-11-08 DIAGNOSIS — N52.9 ERECTILE DYSFUNCTION, UNSPECIFIED ERECTILE DYSFUNCTION TYPE: ICD-10-CM

## 2021-11-08 NOTE — TELEPHONE ENCOUNTER
To Karthik Montana PA-C to advise on his sildenafil refill request  Last OV was 10/26/21.    To Karthik Montana PA-C for refill needs.  Rhiannon Richardson RN

## 2021-11-09 RX ORDER — SILDENAFIL CITRATE 20 MG/1
20-100 TABLET ORAL DAILY PRN
Qty: 90 TABLET | Refills: 0 | Status: SHIPPED | OUTPATIENT
Start: 2021-11-09 | End: 2024-04-05

## 2022-01-09 ENCOUNTER — HEALTH MAINTENANCE LETTER (OUTPATIENT)
Age: 47
End: 2022-01-09

## 2022-02-19 DIAGNOSIS — I10 BENIGN ESSENTIAL HYPERTENSION: ICD-10-CM

## 2022-02-21 RX ORDER — HYDROCHLOROTHIAZIDE 25 MG/1
TABLET ORAL
Qty: 90 TABLET | Refills: 0 | Status: SHIPPED | OUTPATIENT
Start: 2022-02-21 | End: 2022-09-19

## 2022-02-21 NOTE — TELEPHONE ENCOUNTER
Prescription approved per [facility] Refill Protocol.  Due for office visit April.  Hilary Win RN

## 2022-12-17 DIAGNOSIS — I10 BENIGN ESSENTIAL HYPERTENSION: ICD-10-CM

## 2022-12-19 DIAGNOSIS — I10 BENIGN ESSENTIAL HYPERTENSION: ICD-10-CM

## 2022-12-19 RX ORDER — HYDROCHLOROTHIAZIDE 25 MG/1
TABLET ORAL
Qty: 90 TABLET | Refills: 0 | OUTPATIENT
Start: 2022-12-19

## 2022-12-19 NOTE — TELEPHONE ENCOUNTER
Medication Question or Refill        What medication are you calling about (include dose and sig)?: Hydrachlorizide Pt did not know dosage    Controlled Substance Agreement on file:   CSA -- Patient Level:    CSA: None found at the patient level.       Who prescribed the medication? PCP    Do you need a refill? Yes    When did you use the medication last? 1 week ago    Patient offered an appointment? Yes    Do you have any questions or concerns?  No (EXPLAIN)/NO:[id number]}    Preferred Pharmacy:       [id number] - [place] - [id number] [place] [address]  Phone: [phone] Fax: [phone]      Could we send this information to you in [employer] or would you prefer to receive a phone call?:  [phone] ok to leave a message

## 2022-12-20 DIAGNOSIS — I10 BENIGN ESSENTIAL HYPERTENSION: ICD-10-CM

## 2022-12-22 RX ORDER — HYDROCHLOROTHIAZIDE 25 MG/1
25 TABLET ORAL DAILY
Qty: 30 TABLET | Refills: 0 | Status: SHIPPED | OUTPATIENT
Start: 2022-12-22 | End: 2023-01-11

## 2022-12-22 RX ORDER — HYDROCHLOROTHIAZIDE 25 MG/1
TABLET ORAL
Qty: 30 TABLET | Refills: 0 | Status: SHIPPED | OUTPATIENT
Start: 2022-12-22 | End: 2023-01-11

## 2022-12-22 NOTE — TELEPHONE ENCOUNTER
Patient has an appointment in January.   30 days refilled   PCP is out of office  Kristen Kehr PA-C

## 2022-12-25 ENCOUNTER — HEALTH MAINTENANCE LETTER (OUTPATIENT)
Age: 47
End: 2022-12-25

## 2023-01-11 ENCOUNTER — OFFICE VISIT (OUTPATIENT)
Dept: FAMILY MEDICINE | Facility: CLINIC | Age: 48
End: 2023-01-11
Payer: COMMERCIAL

## 2023-01-11 VITALS
BODY MASS INDEX: 33.21 KG/M2 | OXYGEN SATURATION: 96 % | RESPIRATION RATE: 16 BRPM | SYSTOLIC BLOOD PRESSURE: 136 MMHG | WEIGHT: 232 LBS | DIASTOLIC BLOOD PRESSURE: 85 MMHG | HEIGHT: 70 IN | TEMPERATURE: 96.8 F | HEART RATE: 71 BPM

## 2023-01-11 DIAGNOSIS — Z11.4 ENCOUNTER FOR SCREENING FOR HIV: ICD-10-CM

## 2023-01-11 DIAGNOSIS — Z11.59 NEED FOR HEPATITIS C SCREENING TEST: ICD-10-CM

## 2023-01-11 DIAGNOSIS — Z12.12 SCREENING FOR MALIGNANT NEOPLASM OF THE RECTUM: ICD-10-CM

## 2023-01-11 DIAGNOSIS — Z82.49 FAMILY HISTORY OF PREMATURE CORONARY ARTERY DISEASE: ICD-10-CM

## 2023-01-11 DIAGNOSIS — I10 BENIGN ESSENTIAL HYPERTENSION: Primary | ICD-10-CM

## 2023-01-11 PROCEDURE — 87389 HIV-1 AG W/HIV-1&-2 AB AG IA: CPT | Performed by: PHYSICIAN ASSISTANT

## 2023-01-11 PROCEDURE — 90471 IMMUNIZATION ADMIN: CPT | Performed by: PHYSICIAN ASSISTANT

## 2023-01-11 PROCEDURE — 80061 LIPID PANEL: CPT | Performed by: PHYSICIAN ASSISTANT

## 2023-01-11 PROCEDURE — 80053 COMPREHEN METABOLIC PANEL: CPT | Performed by: PHYSICIAN ASSISTANT

## 2023-01-11 PROCEDURE — 99214 OFFICE O/P EST MOD 30 MIN: CPT | Mod: 25 | Performed by: PHYSICIAN ASSISTANT

## 2023-01-11 PROCEDURE — 36415 COLL VENOUS BLD VENIPUNCTURE: CPT | Performed by: PHYSICIAN ASSISTANT

## 2023-01-11 PROCEDURE — 90715 TDAP VACCINE 7 YRS/> IM: CPT | Performed by: PHYSICIAN ASSISTANT

## 2023-01-11 PROCEDURE — 86803 HEPATITIS C AB TEST: CPT | Performed by: PHYSICIAN ASSISTANT

## 2023-01-11 RX ORDER — HYDROCHLOROTHIAZIDE 25 MG/1
25 TABLET ORAL DAILY
Qty: 90 TABLET | Refills: 3 | Status: SHIPPED | OUTPATIENT
Start: 2023-01-11 | End: 2024-01-30

## 2023-01-11 RX ORDER — LOSARTAN POTASSIUM 100 MG/1
100 TABLET ORAL DAILY
Qty: 90 TABLET | Refills: 3 | Status: SHIPPED | OUTPATIENT
Start: 2023-01-11 | End: 2024-02-12

## 2023-01-11 RX ORDER — ATORVASTATIN CALCIUM 40 MG/1
40 TABLET, FILM COATED ORAL DAILY
Qty: 90 TABLET | Refills: 3 | Status: SHIPPED | OUTPATIENT
Start: 2023-01-11 | End: 2024-03-01

## 2023-01-11 ASSESSMENT — PAIN SCALES - GENERAL: PAINLEVEL: NO PAIN (0)

## 2023-01-11 NOTE — PROGRESS NOTES
"  Assessment & Plan       ICD-10-CM    1. Benign essential hypertension  I10 Lipid panel reflex to direct LDL Non-fasting     Comprehensive metabolic panel (BMP + Alb, Alk Phos, ALT, AST, Total. Bili, TP)     atorvastatin (LIPITOR) 40 MG tablet     hydrochlorothiazide (HYDRODIURIL) 25 MG tablet     losartan (COZAAR) 100 MG tablet     Lipid panel reflex to direct LDL Non-fasting     Comprehensive metabolic panel (BMP + Alb, Alk Phos, ALT, AST, Total. Bili, TP)      2. Family history of premature coronary artery disease  Z82.49 Lipid panel reflex to direct LDL Non-fasting     Comprehensive metabolic panel (BMP + Alb, Alk Phos, ALT, AST, Total. Bili, TP)     atorvastatin (LIPITOR) 40 MG tablet     Lipid panel reflex to direct LDL Non-fasting     Comprehensive metabolic panel (BMP + Alb, Alk Phos, ALT, AST, Total. Bili, TP)      3. Screening for malignant neoplasm of the rectum  Z12.12 Colonoscopy Screening  Referral      4. Need for hepatitis C screening test  Z11.59 Hepatitis C Screen Reflex to HCV RNA Quant and Genotype     Hepatitis C Screen Reflex to HCV RNA Quant and Genotype      5. Encounter for screening for HIV  Z11.4 HIV Antigen Antibody Combo     HIV Antigen Antibody Combo        medical conditions are stable. meds refilled.  Work on Healthy diet and exercise. Getting heart rate elevated for 30 mins most days of week.  Labs pending. Follow up  Dependant on labs.       Nicotine/Tobacco Cessation:  He reports that he has never smoked. His smokeless tobacco use includes chew.  Nicotine/Tobacco Cessation Plan:   Information offered: Patient not interested at this time      BMI:   Estimated body mass index is 33.29 kg/m  as calculated from the following:    Height as of this encounter: 1.778 m (5' 10\").    Weight as of this encounter: 105.2 kg (232 lb).   Weight management plan: Discussed healthy diet and exercise guidelines      Return in about 1 year (around 1/11/2024) for recheck.    Karthik Cabello " "ANTONIA Montana [facility]   Franklin is a 47 year old accompanied by his self, presenting for the following health issues:  Recheck Medication      History of Present Illness       Reason for visit:  Blood pressure followup    He eats 0-1 servings of fruits and vegetables daily.He consumes 1 sweetened beverage(s) daily.He exercises with enough effort to increase his heart rate 9 or less minutes per day.  He exercises with enough effort to increase his heart rate 3 or less days per week.   He is taking medications regularly.         Hyperlipidemia Follow-Up      Are you regularly taking any medication or supplement to lower your cholesterol?   Yes- lipitor    Are you having muscle aches or other side effects that you think could be caused by your cholesterol lowering medication?  No    Hypertension Follow-up      Do you check your blood pressure regularly outside of the clinic? No     Are you following a low salt diet? No    Are your blood pressures ever more than 140 on the top number (systolic) OR more   than 90 on the bottom number (diastolic), for example 140/90? No      Review of Systems   Constitutional, HEENT, cardiovascular, pulmonary, gi and gu systems are negative, except as otherwise noted.      Objective    /85   Pulse 71   Temp 96.8  F (36  C) (Tympanic)   Resp 16   Ht 1.778 m (5' 10\")   Wt 105.2 kg (232 lb)   SpO2 96%   BMI 33.29 kg/m    Body mass index is 33.29 kg/m .  Physical Exam   GENERAL: healthy, alert and no distress  EYES: Eyes grossly normal to inspection, PERRL and conjunctivae and sclerae normal  HENT: ear canals and TM's normal, nose and mouth without ulcers or lesions  NECK: no adenopathy, no asymmetry, masses, or scars and thyroid normal to palpation  RESP: lungs clear to auscultation - no rales, rhonchi or wheezes  CV: regular rate and rhythm, normal S1 S2, no S3 or S4, no murmur, click or rub, no peripheral edema and peripheral pulses " strong  ABDOMEN: soft, nontender, no hepatosplenomegaly, no masses and bowel sounds normal  MS: no gross musculoskeletal defects noted, no edema  SKIN: no suspicious lesions or rashes  NEURO: Normal strength and tone, mentation intact and speech normal  PSYCH: mentation appears normal, affect normal/bright    Labs pending

## 2023-01-12 LAB
ALBUMIN SERPL-MCNC: 4.2 G/DL (ref 3.4–5)
ALP SERPL-CCNC: 69 U/L (ref 40–150)
ALT SERPL W P-5'-P-CCNC: 79 U/L (ref 0–70)
ANION GAP SERPL CALCULATED.3IONS-SCNC: 8 MMOL/L (ref 3–14)
AST SERPL W P-5'-P-CCNC: 31 U/L (ref 0–45)
BILIRUB SERPL-MCNC: 0.7 MG/DL (ref 0.2–1.3)
BUN SERPL-MCNC: 20 MG/DL (ref 7–30)
CALCIUM SERPL-MCNC: 9.3 MG/DL (ref 8.5–10.1)
CHLORIDE BLD-SCNC: 106 MMOL/L (ref 94–109)
CHOLEST SERPL-MCNC: 207 MG/DL
CO2 SERPL-SCNC: 27 MMOL/L (ref 20–32)
CREAT SERPL-MCNC: 1.09 MG/DL (ref 0.66–1.25)
FASTING STATUS PATIENT QL REPORTED: NO
GFR SERPL CREATININE-BSD FRML MDRD: 84 ML/MIN/1.73M2
GLUCOSE BLD-MCNC: 76 MG/DL (ref 70–99)
HCV AB SERPL QL IA: NONREACTIVE
HDLC SERPL-MCNC: 34 MG/DL
HIV 1+2 AB+HIV1 P24 AG SERPL QL IA: NONREACTIVE
LDLC SERPL CALC-MCNC: 108 MG/DL
NONHDLC SERPL-MCNC: 173 MG/DL
POTASSIUM BLD-SCNC: 3.6 MMOL/L (ref 3.4–5.3)
PROT SERPL-MCNC: 7.7 G/DL (ref 6.8–8.8)
SODIUM SERPL-SCNC: 141 MMOL/L (ref 133–144)
TRIGL SERPL-MCNC: 326 MG/DL

## 2023-04-16 ENCOUNTER — HEALTH MAINTENANCE LETTER (OUTPATIENT)
Age: 48
End: 2023-04-16

## 2023-07-07 NOTE — TELEPHONE ENCOUNTER
This medication was last refilled in 2019.  Routing to provider to advise.  Virgie Arriaga BSN, RN         [Pale Nasal Mucosa] : pale nasal mucosa [Clear Rhinorrhea] : clear rhinorrhea [NL] : warm, clear

## 2023-10-04 ENCOUNTER — OFFICE VISIT (OUTPATIENT)
Dept: FAMILY MEDICINE | Facility: CLINIC | Age: 48
End: 2023-10-04
Payer: COMMERCIAL

## 2023-10-04 VITALS
BODY MASS INDEX: 33.79 KG/M2 | OXYGEN SATURATION: 97 % | DIASTOLIC BLOOD PRESSURE: 86 MMHG | RESPIRATION RATE: 20 BRPM | HEART RATE: 74 BPM | TEMPERATURE: 97.3 F | HEIGHT: 70 IN | WEIGHT: 236 LBS | SYSTOLIC BLOOD PRESSURE: 132 MMHG

## 2023-10-04 DIAGNOSIS — D17.30 LIPOMA OF SKIN AND SUBCUTANEOUS TISSUE: Primary | ICD-10-CM

## 2023-10-04 PROCEDURE — 99213 OFFICE O/P EST LOW 20 MIN: CPT | Performed by: PHYSICIAN ASSISTANT

## 2023-10-04 ASSESSMENT — PAIN SCALES - GENERAL: PAINLEVEL: NO PAIN (0)

## 2023-10-04 NOTE — PROGRESS NOTES
"  Assessment & Plan       ICD-10-CM    1. Lipoma of skin and subcutaneous tissue  D17.30          5cm circular mass under the left armpit, likely a lipoma. It is not bothersome or painful to the patient at this time. Warning signs discussed. If mass becomes bothersome, could consult general surgery in the future due to location of the mass.     The student acted as a scribe and the encounter documented above was completely performed by myself and the documentation reflects the work I have performed today.    MILI Shelton-S2  MILI Garcia-C  [facility]   Franklin is a 47 year old, presenting for the following health issues:  Mass (Left shoulder area)        10/4/2023     7:11 AM   Additional Questions   Roomed by patricia   Accompanied by self         10/4/2023     7:11 AM   Patient Reported Additional Medications   Patient reports taking the following new medications see chart       History of Present Illness       Reason for visit:  Lump jn shoulder area    He eats 0-1 servings of fruits and vegetables daily.He consumes 1 sweetened beverage(s) daily.He exercises with enough effort to increase his heart rate 9 or less minutes per day.  He exercises with enough effort to increase his heart rate 3 or less days per week.   He is taking medications regularly.     Franklin is a 48 y/o male who presents for a mass under his left armpit. States he noticed it a couple weeks ago during his trip to [place] when he looked in the mirror. Denies pain and states it does not bother him at all. No other concerns today.         Objective    /86   Pulse 74   Temp 97.3  F (36.3  C) (Tympanic)   Resp 20   Ht 1.778 m (5' 10\")   Wt 107 kg (236 lb)   SpO2 97%   BMI 33.86 kg/m    Body mass index is 33.86 kg/m .  Physical Exam   GENERAL: healthy, alert and no distress  SKIN: 5cm circular, non-tender mass under left armpit - consistent with lipoma.   PSYCH: mentation appears normal, " affect normal/bright

## 2024-01-01 NOTE — PROGRESS NOTES
"Franklin Mullen is a 44 year old male who is being evaluated via a billable telephone visit.      The patient has been notified of following:     \"This telephone visit will be conducted via a call between you and your physician/provider. We have found that certain health care needs can be provided without the need for a physical exam.  This service lets us provide the care you need with a short phone conversation.  If a prescription is necessary we can send it directly to your pharmacy.  If lab work is needed we can place an order for that and you can then stop by our lab to have the test done at a later time.    Telephone visits are billed at different rates depending on your insurance coverage. During this emergency period, for some insurers they may be billed the same as an in-person visit.  Please reach out to your insurance provider with any questions.    If during the course of the call the physician/provider feels a telephone visit is not appropriate, you will not be charged for this service.\"    Patient has given verbal consent for Telephone visit?  Yes    What phone number would you like to be contacted at? [phone]    How would you like to obtain your AVS? Nic Crespo     Franklin Mullen is a 44 year old male who presents via phone visit today for the following health issues:    HPI    Hyperlipidemia Follow-Up      Are you regularly taking any medication or supplement to lower your cholesterol?   No    Are you having muscle aches or other side effects that you think could be caused by your cholesterol lowering medication?  No    Hypertension Follow-up      Do you check your blood pressure regularly outside of the clinic? Checks about once a week     Are you following a low salt diet? Yes    Are your blood pressures ever more than 140 on the top number (systolic) OR more   than 90 on the bottom number (diastolic), for example 140/90? Yes    Review of Systems   Constitutional, HEENT, " Spoke with cranial tech. They are looking for supporting notes. Nursing will fax notes from most recent [facility].    cardiovascular, pulmonary, gi and gu systems are negative, except as otherwise noted.       Objective          Vitals:  No vitals were obtained today due to virtual visit.    healthy, alert and no distress  PSYCH: Alert and oriented times 3; coherent speech, normal   rate and volume, able to articulate logical thoughts, able   to abstract reason, no tangential thoughts, no hallucinations   or delusions  His affect is normal  RESP: No cough, no audible wheezing, able to talk in full sentences  Remainder of exam unable to be completed due to telephone visits    No results found for this or any previous visit (from the past 24 hour(s)).        Assessment/Plan:    Assessment & Plan       ICD-10-CM    1. Family history of premature coronary artery disease  Z82.49 Lipid panel reflex to direct LDL Fasting     **Comprehensive metabolic panel FUTURE anytime   2. Benign essential hypertension  I10 Lipid panel reflex to direct LDL Fasting     **Comprehensive metabolic panel FUTURE anytime     hydrochlorothiazide (HYDRODIURIL) 25 MG tablet     losartan (COZAAR) 50 MG tablet      Talk to patient on the phone regarding his concerns.  We will get him set up for future fasting labs.  We will have his blood pressure checked at that time by the ancillary services.  We talked about diet and exercise.  We talked about his family history and the fact that his dad had coronary artery disease at age 39 that I encouraged him to start the cholesterol medication.  Warning signs and side effects were discussed.  Recheck in clinic in 6 months.    Return in about 6 months (around 3/4/2021) for recheck.    Karthik Montana PA-C  [facility]    Phone call duration:  5 minutes

## 2024-01-30 DIAGNOSIS — I10 BENIGN ESSENTIAL HYPERTENSION: ICD-10-CM

## 2024-01-30 RX ORDER — HYDROCHLOROTHIAZIDE 25 MG/1
25 TABLET ORAL DAILY
Qty: 90 TABLET | Refills: 0 | Status: SHIPPED | OUTPATIENT
Start: 2024-01-30 | End: 2024-03-01

## 2024-01-30 NOTE — LETTER
January 30, 2024    Franklin Mullen  [address] [address] [address]    Dear Franklin,       We recently received a refill request for hydrochlorothiazide (HYDRODIURIL) 25 MG tablet.  We have refilled this for a one time 90 day supply only because you are due for a:    Medication Check office visit      Please call at your earliest convenience so that there will not be a delay with your future refills.          Thank you,   Your [facility] Team/[place]  [phone]

## 2024-02-03 ENCOUNTER — MYC REFILL (OUTPATIENT)
Dept: FAMILY MEDICINE | Facility: CLINIC | Age: 49
End: 2024-02-03
Payer: COMMERCIAL

## 2024-02-03 DIAGNOSIS — I10 BENIGN ESSENTIAL HYPERTENSION: ICD-10-CM

## 2024-02-05 RX ORDER — HYDROCHLOROTHIAZIDE 25 MG/1
25 TABLET ORAL DAILY
Qty: 90 TABLET | Refills: 0 | OUTPATIENT
Start: 2024-02-05

## 2024-02-05 NOTE — TELEPHONE ENCOUNTER
1: 27 AM 
07/30/18 Discharge instructions given to Michelle Gomezr (name) with verbalization of understanding. Patient accompanied by son. Patient discharged with the following prescriptions prednisone & albuterol. Patient discharged to home (destination). [facility] refills that are already active on file. Refused request to pharmacy.

## 2024-02-10 DIAGNOSIS — I10 BENIGN ESSENTIAL HYPERTENSION: ICD-10-CM

## 2024-02-12 RX ORDER — LOSARTAN POTASSIUM 100 MG/1
100 TABLET ORAL DAILY
Qty: 90 TABLET | Refills: 3 | Status: SHIPPED | OUTPATIENT
Start: 2024-02-12

## 2024-03-01 ENCOUNTER — OFFICE VISIT (OUTPATIENT)
Dept: FAMILY MEDICINE | Facility: CLINIC | Age: 49
End: 2024-03-01
Payer: COMMERCIAL

## 2024-03-01 VITALS
RESPIRATION RATE: 16 BRPM | SYSTOLIC BLOOD PRESSURE: 134 MMHG | WEIGHT: 243 LBS | OXYGEN SATURATION: 96 % | TEMPERATURE: 97.3 F | HEART RATE: 83 BPM | BODY MASS INDEX: 34.79 KG/M2 | HEIGHT: 70 IN | DIASTOLIC BLOOD PRESSURE: 98 MMHG

## 2024-03-01 DIAGNOSIS — Z82.49 FAMILY HISTORY OF PREMATURE CORONARY ARTERY DISEASE: ICD-10-CM

## 2024-03-01 DIAGNOSIS — I10 BENIGN ESSENTIAL HYPERTENSION: Primary | ICD-10-CM

## 2024-03-01 DIAGNOSIS — Z12.12 SCREENING FOR MALIGNANT NEOPLASM OF THE RECTUM: ICD-10-CM

## 2024-03-01 LAB
ALBUMIN SERPL BCG-MCNC: 4.3 G/DL (ref 3.5–5.2)
ALP SERPL-CCNC: 63 U/L (ref 40–150)
ALT SERPL W P-5'-P-CCNC: 53 U/L (ref 0–70)
ANION GAP SERPL CALCULATED.3IONS-SCNC: 9 MMOL/L (ref 7–15)
AST SERPL W P-5'-P-CCNC: 30 U/L (ref 0–45)
BILIRUB SERPL-MCNC: 0.4 MG/DL
BUN SERPL-MCNC: 18.2 MG/DL (ref 6–20)
CALCIUM SERPL-MCNC: 8.8 MG/DL (ref 8.6–10)
CHLORIDE SERPL-SCNC: 104 MMOL/L (ref 98–107)
CHOLEST SERPL-MCNC: 186 MG/DL
CREAT SERPL-MCNC: 1.05 MG/DL (ref 0.67–1.17)
DEPRECATED HCO3 PLAS-SCNC: 27 MMOL/L (ref 22–29)
EGFRCR SERPLBLD CKD-EPI 2021: 87 ML/MIN/1.73M2
FASTING STATUS PATIENT QL REPORTED: NO
GLUCOSE SERPL-MCNC: 101 MG/DL (ref 70–99)
HDLC SERPL-MCNC: 30 MG/DL
LDLC SERPL CALC-MCNC: 94 MG/DL
NONHDLC SERPL-MCNC: 156 MG/DL
POTASSIUM SERPL-SCNC: 4 MMOL/L (ref 3.4–5.3)
PROT SERPL-MCNC: 6.9 G/DL (ref 6.4–8.3)
SODIUM SERPL-SCNC: 140 MMOL/L (ref 135–145)
TRIGL SERPL-MCNC: 312 MG/DL

## 2024-03-01 PROCEDURE — 36415 COLL VENOUS BLD VENIPUNCTURE: CPT | Performed by: PHYSICIAN ASSISTANT

## 2024-03-01 PROCEDURE — 80061 LIPID PANEL: CPT | Performed by: PHYSICIAN ASSISTANT

## 2024-03-01 PROCEDURE — 99214 OFFICE O/P EST MOD 30 MIN: CPT | Performed by: PHYSICIAN ASSISTANT

## 2024-03-01 PROCEDURE — 80053 COMPREHEN METABOLIC PANEL: CPT | Performed by: PHYSICIAN ASSISTANT

## 2024-03-01 RX ORDER — HYDROCHLOROTHIAZIDE 25 MG/1
25 TABLET ORAL DAILY
Qty: 90 TABLET | Refills: 2 | Status: SHIPPED | OUTPATIENT
Start: 2024-03-01

## 2024-03-01 RX ORDER — ATORVASTATIN CALCIUM 40 MG/1
40 TABLET, FILM COATED ORAL DAILY
Qty: 90 TABLET | Refills: 3 | Status: SHIPPED | OUTPATIENT
Start: 2024-03-01

## 2024-03-01 RX ORDER — METOPROLOL SUCCINATE 25 MG/1
25 TABLET, EXTENDED RELEASE ORAL DAILY
Qty: 30 TABLET | Refills: 0 | Status: SHIPPED | OUTPATIENT
Start: 2024-03-01 | End: 2024-04-05

## 2024-03-01 ASSESSMENT — PAIN SCALES - GENERAL: PAINLEVEL: NO PAIN (0)

## 2024-03-01 NOTE — PROGRESS NOTES
"  Assessment & Plan       ICD-10-CM    1. Benign essential hypertension  I10 Lipid panel reflex to direct LDL Non-fasting     Comprehensive metabolic panel (BMP + Alb, Alk Phos, ALT, AST, Total. Bili, TP)     hydrochlorothiazide (HYDRODIURIL) 25 MG tablet     atorvastatin (LIPITOR) 40 MG tablet     Adult Sleep Eval & Management  Referral     metoprolol succinate ER (TOPROL XL) 25 MG 24 hr tablet     Lipid panel reflex to direct LDL Non-fasting     Comprehensive metabolic panel (BMP + Alb, Alk Phos, ALT, AST, Total. Bili, TP)      2. Family history of premature coronary artery disease  Z82.49 atorvastatin (LIPITOR) 40 MG tablet      3. BMI 34.0-34.9,adult  Z68.34 Adult Sleep Eval & Management  Referral      4. Screening for malignant neoplasm of the rectum  Z12.12 Colonoscopy Screening  Referral        Work on Healthy diet and exercise. Getting heart rate elevated for 30 mins most days of week.  Will add metoprolol. warning signs discussed. side effects discussed recheck blood pressure in 4 wks.   Recommend follow up  with sleep clinic.       Nicotine/Tobacco Cessation  He reports that he has never smoked. His smokeless tobacco use includes chew.  Nicotine/Tobacco Cessation Plan  Pharmacotherapies : Nicotine gum      BMI  Estimated body mass index is 34.87 kg/m  as calculated from the following:    Height as of this encounter: 1.778 m (5' 10\").    Weight as of this encounter: 110.2 kg (243 lb).   Weight management plan: Discussed healthy diet and exercise guidelines      Zak Reid is a 48 year old, presenting for the following health issues:  Hypertension    History of Present Illness       Hypertension: He presents for follow up of hypertension.  He does check blood pressure  regularly outside of the clinic. Outside blood pressures have been over 140/90. He follows a low salt diet.     He eats 0-1 servings of fruits and vegetables daily.He consumes 1 sweetened beverage(s) " "daily.He exercises with enough effort to increase his heart rate 9 or less minutes per day.  He exercises with enough effort to increase his heart rate 3 or less days per week.   He is taking medications regularly.         Hypertension Follow-up    Do you check your blood pressure regularly outside of the clinic? Yes   Are you following a low salt diet? No  Are your blood pressures ever more than 140 on the top number (systolic) OR more   than 90 on the bottom number (diastolic), for example 140/90? Yes  Hyperlipidemia Follow-Up    Are you regularly taking any medication or supplement to lower your cholesterol?   Yes- lipitor  Are you having muscle aches or other side effects that you think could be caused by your cholesterol lowering medication?  No    Review of Systems  Constitutional, HEENT, cardiovascular, pulmonary, gi and gu systems are negative, except as otherwise noted.      Objective    BP (!) 134/98   Pulse 83   Temp 97.3  F (36.3  C) (Tympanic)   Resp 16   Ht 1.778 m (5' 10\")   Wt 110.2 kg (243 lb)   SpO2 96%   BMI 34.87 kg/m    Body mass index is 34.87 kg/m .  Physical Exam   GENERAL: alert and no distress  NECK: no adenopathy, no asymmetry, masses, or scars  RESP: lungs clear to auscultation - no rales, rhonchi or wheezes  CV: regular rate and rhythm, normal S1 S2, no S3 or S4, no murmur, click or rub, no peripheral edema  ABDOMEN: soft, nontender, no hepatosplenomegaly, no masses and bowel sounds normal  MS: no gross musculoskeletal defects noted, no edema    Labs pending        Signed Electronically by: Karthik Montana PA-C    "

## 2024-03-21 ENCOUNTER — TELEPHONE (OUTPATIENT)
Dept: GASTROENTEROLOGY | Facility: CLINIC | Age: 49
End: 2024-03-21
Payer: COMMERCIAL

## 2024-03-21 NOTE — TELEPHONE ENCOUNTER
"Endoscopy Scheduling Screen    Have you had a positive Covid test in the last 14 days?  No      What is your communication preference for Instructions and/or Bowel Prep?   MyChart      What insurance is in the chart?  Other:  r    Ordering/Referring Provider: RUFINO RIBERA   (If ordering provider performs procedure, schedule with ordering provider unless otherwise instructed. )    BMI: Estimated body mass index is 34.87 kg/m  as calculated from the following:    Height as of 3/1/24: 1.778 m (5' 10\").    Weight as of 3/1/24: 110.2 kg (243 lb).     Sedation Ordered  moderate sedation.   If patient BMI > 50 do not schedule in [facility].    If patient BMI > 45 do not schedule at [facility].    Are you taking methadone or Suboxone?  No    Have you had difficulties, pain, or discomfort during past endoscopy procedures?  No    Are you taking any prescription medications for pain 3 or more times per week?   NO, No RN review required.      Do you have a history of malignant hyperthermia?  No      (Females) Are you currently pregnant?          Have you been diagnosed or told you have pulmonary hypertension?   No      Do you have an LVAD?  No      Have you been told you have moderate to severe sleep apnea?  No    Have you been told you have COPD, asthma, or any other lung disease?  No      Do you have any heart conditions?  No       Have you ever had or are you waiting for an organ transplant?  No. Continue scheduling, no site restrictions.      Have you had a stroke or transient ischemic attack (TIA aka \"mini stroke\" in the last 6 months?   No      Have you been diagnosed with or been told you have cirrhosis of the liver?   No      Are you currently on dialysis?   No      Do you need assistance transferring?   No    BMI: Estimated body mass index is 34.87 kg/m  as calculated from the following:    Height as of 3/1/24: 1.778 m (5' 10\").    Weight as of 3/1/24: 110.2 kg (243 lb).     Is patients BMI > 40 and scheduling location " [facility]?  No      Do you take an injectable medication for weight loss or diabetes (excluding insulin)?  No    Do you take the medication Naltrexone?  No    Do you take blood thinners?  No       Prep   Are you currently on dialysis or do you have chronic kidney disease?  No    Do you have a diagnosis of diabetes?  No    Do you have a diagnosis of cystic fibrosis (CF)?  No    On a regular basis do you go 3 -5 days between bowel movements?  No    BMI > 40?  No    Preferred Pharmacy:    [id number]  [place], [place] - [address] AT [facility]  [address] [place] [address]  Phone: [phone] Fax: [phone]      Final Scheduling Details     Procedure scheduled  Colonoscopy    Surgeon:  YEE     Date of procedure:  6/3/24     Pre-OP / PAC:   No - Not required for this site.    Location  [facility] - [facility] - Patient preference.    Sedation   Moderate Sedation - Per order.      Patient Reminders:   You will receive a call from a Nurse to review instructions and health history.  This assessment must be completed prior to your procedure.  Failure to complete the Nurse assessment may result in the procedure being cancelled.      On the day of your procedure, please designate an adult(s) who can drive you home stay with you for the next 24 hours. The medicines used in the exam will make you sleepy. You will not be able to drive.      You cannot take public transportation, ride share services, or non-medical taxi service without a responsible caregiver.  Medical transport services are allowed with the requirement that a responsible caregiver will receive you at your destination.  We require that drivers and caregivers are confirmed prior to your procedure.

## 2024-04-02 ENCOUNTER — TELEPHONE (OUTPATIENT)
Dept: FAMILY MEDICINE | Facility: CLINIC | Age: 49
End: 2024-04-02
Payer: COMMERCIAL

## 2024-04-02 NOTE — TELEPHONE ENCOUNTER
Patient Quality Outreach    Patient is due for the following:   Hypertension -  BP check  Colon Cancer Screening  Physical Preventive Adult Physical    Next Steps:   Patient has upcoming appointment, these items will be addressed at that time.    Type of outreach:    None     Next Steps:  Reach out within 90 days via  none .    Max number of attempts reached: No. Will try again in 90 days if patient still on fail list.    Questions for provider review:    None           Dara Joaquin MA  Chart routed to Care Team.

## 2024-04-05 ENCOUNTER — OFFICE VISIT (OUTPATIENT)
Dept: FAMILY MEDICINE | Facility: CLINIC | Age: 49
End: 2024-04-05
Payer: COMMERCIAL

## 2024-04-05 VITALS
HEART RATE: 83 BPM | DIASTOLIC BLOOD PRESSURE: 88 MMHG | BODY MASS INDEX: 34.36 KG/M2 | SYSTOLIC BLOOD PRESSURE: 128 MMHG | WEIGHT: 240 LBS | RESPIRATION RATE: 16 BRPM | OXYGEN SATURATION: 98 % | TEMPERATURE: 96.5 F | HEIGHT: 70 IN

## 2024-04-05 DIAGNOSIS — I10 BENIGN ESSENTIAL HYPERTENSION: Primary | ICD-10-CM

## 2024-04-05 DIAGNOSIS — Z82.49 FAMILY HISTORY OF PREMATURE CORONARY ARTERY DISEASE: ICD-10-CM

## 2024-04-05 PROCEDURE — 99213 OFFICE O/P EST LOW 20 MIN: CPT | Performed by: PHYSICIAN ASSISTANT

## 2024-04-05 ASSESSMENT — PAIN SCALES - GENERAL: PAINLEVEL: NO PAIN (0)

## 2024-04-05 NOTE — PROGRESS NOTES
Assessment & Plan       ICD-10-CM    1. Benign essential hypertension  I10 Home Blood Pressure Monitor Order      2. Family history of premature coronary artery disease  Z82.49 CT Coronary Calcium Scan        Talk to patient about his concerns.  At this point we will have him continue work on diet and exercise.  He will continue to monitor his blood pressure at home.  Okay to discontinue metoprolol for now.  We talked about getting a CT heart scan updated due to the family history.  Recheck blood pressure in 3 months.  Warning signs were discussed.          Zak Reid is a 48 year old, presenting for the following health issues:  Hypertension        4/5/2024     8:39 AM   Additional Questions   Roomed by alfa   Accompanied by self         4/5/2024     8:39 AM   Patient Reported Additional Medications   Patient reports taking the following new medications no     History of Present Illness       Hypertension: He presents for follow up of hypertension.  He does check blood pressure  regularly outside of the clinic. Outside blood pressures have been over 140/90. He follows a low salt diet.     He eats 0-1 servings of fruits and vegetables daily.He consumes 1 sweetened beverage(s) daily.He exercises with enough effort to increase his heart rate 9 or less minutes per day.  He exercises with enough effort to increase his heart rate 3 or less days per week.   He is taking medications regularly.  No chest pain or short of breath. No headache or dizziness.   Patient admits to not being consistent on taking his Lipitor up.  When he started the metoprolol and Lipitor more consistently he had bouts of upset stomach and diarrhea.  He has stopped both of them.  His symptoms are better.  He has been determined to have a healthier lifestyle and been working on better diet and exercise.  He does not want to be on all his medications.          Review of Systems  Constitutional, HEENT, cardiovascular, pulmonary, gi and gu  "systems are negative, except as otherwise noted.      Objective    /88   Pulse 83   Temp (!) 96.5  F (35.8  C) (Tympanic)   Resp 16   Ht 1.778 m (5' 10\")   Wt 108.9 kg (240 lb)   SpO2 98%   BMI 34.44 kg/m    Body mass index is 34.44 kg/m .  Physical Exam   GENERAL: alert and no distress  RESP: lungs clear to auscultation - no rales, rhonchi or wheezes  CV: regular rate and rhythm, normal S1 S2, no S3 or S4, no murmur, click or rub, no peripheral edema  MS: no gross musculoskeletal defects noted, no edema          Signed Electronically by: Karthik Montana PA-C    "

## 2024-04-05 NOTE — PROGRESS NOTES
DME (Durable Medical Equipment) Orders and Documentation  Orders Placed This Encounter   Procedures     Home Blood Pressure Monitor Order        The patient was assessed and it was determined the patient is in need of the following listed DME Supplies/Equipment. Please complete supporting documentation below to demonstrate medical necessity.

## 2024-05-23 ENCOUNTER — TELEPHONE (OUTPATIENT)
Dept: GASTROENTEROLOGY | Facility: CLINIC | Age: 49
End: 2024-05-23

## 2024-05-23 NOTE — TELEPHONE ENCOUNTER
Attempted to contact patient in order to complete pre assessment questions.     No answer. Left message to return call to [phone] option 4    Callback required communication sent via Fluentify.      Procedure details:    Patient scheduled for Colonoscopy  on 6/3/24.     Arrival time: 0725. Procedure time 0810    Facility location: [facility]; [address], [place] [address]. Check in location: 2nd Floor at Surgery desk.    Sedation type: Conscious sedation     Pre op exam needed? N/A    Indication for procedure:   Screening for malignant neoplasm of the rectum            Chart review:     Electronic implanted devices? No    Recent diagnosis of diverticulitis within the last 6 weeks? No    Diabetic? No      Medication review:    Anticoagulants? No    NSAIDS? No NSAID medications per patient's medication list.  RN will verify with pre-assessment call.    Other medication HOLDING recommendations:  N/A      Prep for procedure:     Bowel prep recommendation: Standard Miralax  Due to: standard bowel prep.    Prep instructions sent via Fluentify.       Corinne Kliber, RN  Endoscopy Procedure Pre Assessment RN

## 2024-05-28 ENCOUNTER — TELEPHONE (OUTPATIENT)
Dept: GASTROENTEROLOGY | Facility: CLINIC | Age: 49
End: 2024-05-28
Payer: COMMERCIAL

## 2024-05-28 NOTE — TELEPHONE ENCOUNTER
Caller: delon    Reason for Reschedule/Cancellation   (please be detailed, any staff messages or encounters to note?): Patient is going to be out of town.      Prior to reschedule please review:  Ordering Provider:     Karthik Montana PA-C in [facility]     Sedation Determined: moderate  Does patient have any ASC Exclusions, please identify?: n      Notes on Cancelled Procedure:  Procedure: Lower Endoscopy [Colonoscopy]   Date: 06/03/2024  Location: [facility]; [address], [place] [address]   Surgeon: Luis Fernando      Rescheduled: Yes,   Procedure: Lower Endoscopy [Colonoscopy]    Date: 07/29/2024   Location: [facility]; [address], [place] [address]    Surgeon: Luis Fernando   Sedation Level Scheduled  moderte ,  Reason for Sedation Level per order   Instructions updated and sent: y     Does patient need PAC or Pre -Op Rescheduled? : no       Did you cancel or rescheduled an EUS procedure? No.

## 2024-06-23 ENCOUNTER — HEALTH MAINTENANCE LETTER (OUTPATIENT)
Age: 49
End: 2024-06-23

## 2024-06-25 ENCOUNTER — TELEPHONE (OUTPATIENT)
Dept: FAMILY MEDICINE | Facility: CLINIC | Age: 49
End: 2024-06-25
Payer: COMMERCIAL

## 2024-06-25 NOTE — TELEPHONE ENCOUNTER
Patient Quality Outreach    Patient is due for the following:   Colon Cancer Screening  Physical Preventive Adult Physical    Next Steps:   Schedule a Adult Preventative    Type of outreach:    Sent Hojoki message.    Next Steps:  Reach out within 90 days via Acunotet.    Max number of attempts reached: No. Will try again in 90 days if patient still on fail list.    Questions for provider review:    None           Dara Joaquin MA  Chart routed to Care Team.

## 2024-07-19 NOTE — TELEPHONE ENCOUNTER
Rescheduled Procedure  (Please see previous telephone encounter notes for complete details).      Pre visit planning completed.      Procedure details:    Patient scheduled for Colonoscopy on 7/29/24.     Arrival time: 1040. Procedure time 1125    Facility location: [facility]; [address], [place] [address]. Check in location: 2nd Floor at Surgery desk.    Sedation type: Conscious sedation     Pre op exam needed? No.    Indication for procedure: screening      Chart review:     Electronic implanted devices? No    Recent diagnosis of diverticulitis within the last 6 weeks? No    Diabetic? No      Medication review:    Anticoagulants? No    NSAIDS? No NSAID medications per patient's medication list.  RN will verify with pre-assessment call.    Other medication HOLDING recommendations:  N/A      Prep for procedure:     Bowel prep recommendation: Standard Miralax  Due to: standard bowel prep.    Prep instructions sent via PublicBeta         Corinne Kliber, RN  Endoscopy Procedure Pre Assessment RN  [phone] option 4

## 2024-07-19 NOTE — TELEPHONE ENCOUNTER
Pre assessment completed for upcoming procedure.   (Please see previous telephone encounter notes for complete details)    Procedure details:    Arrival time and facility location reviewed.    Pre op exam needed? No.    Designated  policy reviewed. Instructed to have someone stay 6 hours post procedure.     COVID policy reviewed.      Medication review:    Medications reviewed. Please see supporting documentation below. Holding recommendations discussed (if applicable).       Prep for procedure:     Procedure prep instructions reviewed.        Additional information needed?  N/A      Patient  verbalized understanding and had no questions or concerns at this time.      Corinne Kliber, RN  Endoscopy Procedure Pre Assessment   [phone] option 4

## 2024-07-24 ENCOUNTER — TELEPHONE (OUTPATIENT)
Dept: GASTROENTEROLOGY | Facility: CLINIC | Age: 49
End: 2024-07-24
Payer: COMMERCIAL

## 2024-07-24 NOTE — TELEPHONE ENCOUNTER
Caller:     Reason for Reschedule/Cancellation   (please be detailed, any staff messages or encounters to note?): OUT OF TOWN FOR WORK      Prior to reschedule please review:  Ordering Provider:     RUFINO RIBERA     Sedation Determined: CS  Does patient have any ASC Exclusions, please identify?:       Notes on Cancelled Procedure:  Procedure: Lower Endoscopy [Colonoscopy]   Date: 7/29  Location: [facility]; [address], [place] [address]   Surgeon: YEE      Rescheduled: Yes,   Procedure: Lower Endoscopy [Colonoscopy]    Date: 10/28   Location: [facility]; [address], [place] [address]    Surgeon: YEE   Sedation Level Scheduled  CS ,  Reason for Sedation Level ORDER   Instructions updated and sent: Y     Does patient need PAC or Pre -Op Rescheduled? : N       Did you cancel or rescheduled an EUS procedure? No.

## 2024-09-26 ENCOUNTER — MYC MEDICAL ADVICE (OUTPATIENT)
Dept: GASTROENTEROLOGY | Facility: CLINIC | Age: 49
End: 2024-09-26
Payer: COMMERCIAL

## 2024-09-26 ENCOUNTER — TELEPHONE (OUTPATIENT)
Dept: GASTROENTEROLOGY | Facility: CLINIC | Age: 49
End: 2024-09-26
Payer: COMMERCIAL

## 2024-09-26 NOTE — TELEPHONE ENCOUNTER
Caller: writer to patient     Reason for Reschedule/Cancellation   (please be detailed, any staff messages or encounters to note?): provider out of office/left message to call back and case is in depot      Prior to reschedule please review:  Ordering Provider: Oppel  Sedation Determined: mod  Does patient have any ASC Exclusions, please identify?: no      Notes on Cancelled Procedure:  Procedure: Lower Endoscopy [Colonoscopy]   Date: 10/28  Location: [facility]; [address], [place] [address]   Surgeon: Luis Fernando      Rescheduled: No,         Did you cancel or rescheduled an EUS procedure? No.

## 2024-09-30 NOTE — TELEPHONE ENCOUNTER
"Endoscopy Scheduling Screen    Have you had any respiratory illness or flu-like symptoms in the last 10 days?  No    What is your communication preference for Instructions and/or Bowel Prep?   MyChart    What insurance is in the chart?  Other:  [facility]    Ordering/Referring Provider: Oppel   (If ordering provider performs procedure, schedule with ordering provider unless otherwise instructed. )    BMI: Estimated body mass index is 34.44 kg/m  as calculated from the following:    Height as of 4/5/24: 1.778 m (5' 10\").    Weight as of 4/5/24: 108.9 kg (240 lb).     Sedation Ordered  moderate sedation.   If patient BMI > 50 do not schedule in [facility].    If patient BMI > 45 do not schedule at [facility].    Are you taking methadone or Suboxone?  NO, No RN review required.    Have you been diagnosed and are being treated for severe PTSD or severe anxiety?  NO, No RN review required.    Are you taking any prescription medications for pain 3 or more times per week?   NO, No RN review required.    Do you have a history of malignant hyperthermia?  No    (Females) Are you currently pregnant?   No     Have you been diagnosed or told you have pulmonary hypertension?   No    Do you have an LVAD?  No    Have you been told you have moderate to severe sleep apnea?  No.    Have you been told you have COPD, asthma, or any other lung disease?  No    Do you have any heart conditions?  No     Have you ever had or are you waiting for an organ transplant?  No. Continue scheduling, no site restrictions.    Have you had a stroke or transient ischemic attack (TIA aka \"mini stroke\" in the last 6 months?   No    Have you been diagnosed with or been told you have cirrhosis of the liver?   No.    Are you currently on dialysis?   No    Do you need assistance transferring?   No    BMI: Estimated body mass index is 34.44 kg/m  as calculated from the following:    Height as of 4/5/24: 1.778 m (5' 10\").    Weight as of 4/5/24: 108.9 kg (240 lb).     Is patients " BMI > 40 and scheduling location [facility]?  No    Do you take an injectable or oral medication for weight loss or diabetes (excluding insulin)?  No    Do you take the medication Naltrexone?  No    Do you take blood thinners?  No       Prep   Are you currently on dialysis or do you have chronic kidney disease?  No    Do you have a diagnosis of diabetes?  No    Do you have a diagnosis of cystic fibrosis (CF)?  No    On a regular basis do you go 3 -5 days between bowel movements?  No    BMI > 40?  No    Preferred Pharmacy:      [id number] - [place] - [address] AT [facility]  [address] [place] [address]  Phone: [phone] Fax: [phone]      Final Scheduling Details     Procedure scheduled  Colonoscopy    Surgeon:  Hao     Date of procedure:  10/29/2024     Pre-OP / PAC:   No - Not required for this site.    Location  [facility] - [facility] - Patient preference.    Sedation   Moderate Sedation - Per order.      Patient Reminders:   You will receive a call from a Nurse to review instructions and health history.  This assessment must be completed prior to your procedure.  Failure to complete the Nurse assessment may result in the procedure being cancelled.      On the day of your procedure, please designate an adult(s) who can drive you home stay with you for the next 24 hours. The medicines used in the exam will make you sleepy. You will not be able to drive.      You cannot take public transportation, ride share services, or non-medical taxi service without a responsible caregiver.  Medical transport services are allowed with the requirement that a responsible caregiver will receive you at your destination.  We require that drivers and caregivers are confirmed prior to your procedure.

## 2024-09-30 NOTE — TELEPHONE ENCOUNTER
"Endoscopy Scheduling Screen    Have you had any respiratory illness or flu-like symptoms in the last 10 days?  No    What is your communication preference for Instructions and/or Bowel Prep?   MyChart    What insurance is in the chart?  Other:  [facility]    Ordering/Referring Provider: Karthik Montana PA-C   (If ordering provider performs procedure, schedule with ordering provider unless otherwise instructed. )    BMI: Estimated body mass index is 34.44 kg/m  as calculated from the following:    Height as of 4/5/24: 1.778 m (5' 10\").    Weight as of 4/5/24: 108.9 kg (240 lb).     Sedation Ordered  moderate sedation.   If patient BMI > 50 do not schedule in [facility].    If patient BMI > 45 [facility]    Are you taking methadone or Suboxone?  NO, No RN review required.    Have you been diagnosed and are being treated for severe PTSD or severe anxiety?  NO, No RN review required.    Are you taking any prescription medications for pain 3 or more times per week?   NO, No RN review required.    Do you have a history of malignant hyperthermia?  No    (Females) Are you currently pregnant?   No     Have you been diagnosed or told you have pulmonary hypertension?   No    Do you have an LVAD?  No    Have you been told you have moderate to severe sleep apnea?  No.    Have you been told you have COPD, asthma, or any other lung disease?  No    Do you have any heart conditions?  No     Have you ever had or are you waiting for an organ transplant?  No. Continue scheduling, no site restrictions.    Have you had a stroke or transient ischemic attack (TIA aka \"mini stroke\" in the last 6 months?   No    Have you been diagnosed with or been told you have cirrhosis of the liver?   No.    Are you currently on dialysis?   No    Do you need assistance transferring?   No    BMI: Estimated body mass index is 34.44 kg/m  as calculated from the following:    Height as of 4/5/24: 1.778 m (5' 10\").    Weight as of 4/5/24: 108.9 kg (240 " lb).     Is patients BMI > 40 and scheduling location [facility]?  No    Do you take an injectable or oral medication for weight loss or diabetes (excluding insulin)?  No    Do you take the medication Naltrexone?  No    Do you take blood thinners?  No       Prep   Are you currently on dialysis or do you have chronic kidney disease?  No    Do you have a diagnosis of diabetes?  No    Do you have a diagnosis of cystic fibrosis (CF)?  No    On a regular basis do you go 3 -5 days between bowel movements?  No    BMI > 40?  No    Preferred Pharmacy:        [id number] - [place] - [address] AT [facility]  [address] [place] [address]  Phone: [phone] Fax: [phone]      Final Scheduling Details     Procedure scheduled  Colonoscopy    Surgeon:  Hao     Date of procedure:  10/29/2024     Pre-OP / PAC:   No - Not required for this site.    Location  [facility] - Patient preference.    Sedation   Moderate Sedation - Per order.      Patient Reminders:   You will receive a call from a Nurse to review instructions and health history.  This assessment must be completed prior to your procedure.  Failure to complete the Nurse assessment may result in the procedure being cancelled.      On the day of your procedure, please designate an adult(s) who can drive you home stay with you for the next 24 hours. The medicines used in the exam will make you sleepy. You will not be able to drive.      You cannot take public transportation, ride share services, or non-medical taxi service without a responsible caregiver.  Medical transport services are allowed with the requirement that a responsible caregiver will receive you at your destination.  We require that drivers and caregivers are confirmed prior to your procedure.

## 2025-01-18 DIAGNOSIS — I10 BENIGN ESSENTIAL HYPERTENSION: ICD-10-CM

## 2025-01-20 RX ORDER — HYDROCHLOROTHIAZIDE 25 MG/1
25 TABLET ORAL DAILY
Qty: 90 TABLET | Refills: 0 | Status: SHIPPED | OUTPATIENT
Start: 2025-01-20

## 2025-02-13 ENCOUNTER — OFFICE VISIT (OUTPATIENT)
Dept: FAMILY MEDICINE | Facility: OTHER | Age: 50
End: 2025-02-13
Payer: COMMERCIAL

## 2025-02-13 VITALS
RESPIRATION RATE: 16 BRPM | HEART RATE: 80 BPM | DIASTOLIC BLOOD PRESSURE: 88 MMHG | TEMPERATURE: 96.2 F | SYSTOLIC BLOOD PRESSURE: 120 MMHG | BODY MASS INDEX: 35.3 KG/M2 | OXYGEN SATURATION: 97 % | WEIGHT: 246 LBS

## 2025-02-13 DIAGNOSIS — S46.812A TRAPEZIUS STRAIN, LEFT, INITIAL ENCOUNTER: ICD-10-CM

## 2025-02-13 DIAGNOSIS — E78.5 HYPERLIPIDEMIA WITH TARGET LDL LESS THAN 130: ICD-10-CM

## 2025-02-13 DIAGNOSIS — H02.843 SWOLLEN EYELID, RIGHT: ICD-10-CM

## 2025-02-13 DIAGNOSIS — I10 BENIGN ESSENTIAL HYPERTENSION: Primary | ICD-10-CM

## 2025-02-13 ASSESSMENT — PAIN SCALES - GENERAL: PAINLEVEL_OUTOF10: NO PAIN (0)

## 2025-02-13 ASSESSMENT — ENCOUNTER SYMPTOMS: EYE PAIN: 1

## 2025-02-13 NOTE — PROGRESS NOTES
"  Assessment & Plan     Swollen eyelid, right  Trapezius strain, left, initial encounter  Some concern related to overall signs and symptoms of shingles but no rashes noted today at all to the right lateral parietal region above his ear on the right-hand side.  I have him look in the mirror and he states that he looks normal at this point in time with no swollen right eyelid.  Advised careful observation make sure that this does not flower out into shingles and follow-up from there.    Benign essential hypertension  Stable at this point in time with no new concerns.  Follow-up with primary care provider strongly recommended.    Hyperlipidemia with target LDL less than 130  Advised full physical in the near future.  Follow-up with me as needed.    BMI  Estimated body mass index is 35.3 kg/m  as calculated from the following:    Height as of 4/5/24: 1.778 m (5' 10\").    Weight as of this encounter: 111.6 kg (246 lb).   Weight management plan: Patient was referred to their PCP to discuss a diet and exercise plan.    Zak Reid is a 49 year old, presenting for the following health issues:  Eye Problem (Also get a shooting pain on right side of head )      2/13/2025     2:57 PM   Additional Questions   Roomed by mireya     Eye Problem     History of Present Illness       Headaches:   Since the patient's last clinic visit, headaches are: no change  The patient is getting headaches:  Occasionally in the last week  He is able to do normal daily activities when he has a migraine.  The patient is taking the following rescue/relief medications:  Ibuprofen (Advil, Motrin)   Patient states \"I get total relief\" from the rescue/relief medications.   The patient is taking the following medications to prevent migraines:  No medications to prevent migraines  In the past 4 weeks, the patient has gone to an Urgent Care or Emergency Room 0 times times due to headaches.   He is taking medications regularly.     Review of " Systems  Constitutional, HEENT, cardiovascular, pulmonary, GI, , musculoskeletal, neuro, skin, endocrine and psych systems are negative, except as otherwise noted.      Objective    /88 (BP Location: Left arm, Cuff Size: Adult Large)   Pulse 80   Temp (!) 96.2  F (35.7  C) (Temporal)   Resp 16   Wt 111.6 kg (246 lb)   SpO2 97%   BMI 35.30 kg/m    Body mass index is 35.3 kg/m .  Physical Exam   GENERAL: alert and no distress  EYES: Eyes grossly normal to inspection, PERRL and conjunctivae and sclerae normal  HENT: ear canals and TM's normal, nose and mouth without ulcers or lesions  NECK: no adenopathy, no asymmetry, masses, or scars  RESP: lungs clear to auscultation - no rales, rhonchi or wheezes  CV: regular rate and rhythm, normal S1 S2, no S3 or S4, no murmur, click or rub, no peripheral edema  MS: no gross musculoskeletal defects noted, no edema  SKIN: no suspicious lesions or rashes  NEURO: Normal strength and tone, mentation intact and speech normal  PSYCH: mentation appears normal, affect normal/bright    No results found for this or any previous visit (from the past 24 hours).        Signed Electronically by: Terrance Edgar PA-C

## 2025-03-26 ENCOUNTER — VIRTUAL VISIT (OUTPATIENT)
Dept: FAMILY MEDICINE | Facility: CLINIC | Age: 50
End: 2025-03-26
Payer: COMMERCIAL

## 2025-03-26 DIAGNOSIS — Z12.11 COLON CANCER SCREENING: ICD-10-CM

## 2025-03-26 DIAGNOSIS — I10 BENIGN ESSENTIAL HYPERTENSION: Primary | ICD-10-CM

## 2025-03-26 DIAGNOSIS — Z82.49 FAMILY HISTORY OF PREMATURE CORONARY ARTERY DISEASE: ICD-10-CM

## 2025-03-26 PROCEDURE — 98005 SYNCH AUDIO-VIDEO EST LOW 20: CPT | Performed by: PHYSICIAN ASSISTANT

## 2025-03-26 RX ORDER — HYDROCHLOROTHIAZIDE 25 MG/1
25 TABLET ORAL DAILY
Qty: 90 TABLET | Refills: 2 | Status: SHIPPED | OUTPATIENT
Start: 2025-03-26

## 2025-03-26 RX ORDER — LOSARTAN POTASSIUM 100 MG/1
100 TABLET ORAL DAILY
Qty: 90 TABLET | Refills: 3 | Status: SHIPPED | OUTPATIENT
Start: 2025-03-26

## 2025-03-26 NOTE — PROGRESS NOTES
Franklin is a 49 year old who is being evaluated via a billable video visit.    How would you like to obtain your AVS? MyChart  If the video visit is dropped, the invitation should be resent by: Text to cell phone: [phone]  Will anyone else be joining your video visit? No      Assessment & Plan       ICD-10-CM    1. Benign essential hypertension  I10 hydrochlorothiazide (HYDRODIURIL) 25 MG tablet     losartan (COZAAR) 100 MG tablet     Comprehensive metabolic panel (BMP + Alb, Alk Phos, ALT, AST, Total. Bili, TP)     Lipid panel reflex to direct LDL Fasting      2. Family history of premature coronary artery disease  Z82.49 Lipid panel reflex to direct LDL Fasting      3. Colon cancer screening  Z12.11 Colonoscopy Screening  Referral        Work on Healthy diet and exercise. Getting heart rate elevated for 30 mins most days of week.  medical conditions are stable. meds refilled.  Will start lipitor and recheck labs in 2 months.     The longitudinal plan of care for the diagnosis(es)/condition(s) as documented were addressed during this visit. Due to the added complexity in care, I will continue to support Franklin in the subsequent management and with ongoing continuity of care.  Subjective   Franklin is a 49 year old, presenting for the following health issues:  Recheck Medication    History of Present Illness       Reason for visit:  Med check         Hyperlipidemia Follow-Up    Are you regularly taking any medication or supplement to lower your cholesterol?   No -never started it yet.   Are you having muscle aches or other side effects that you think could be caused by your cholesterol lowering medication?  No    Hypertension Follow-up    Do you check your blood pressure regularly outside of the clinic? Yes   Are you following a low salt diet? Yes  Are your blood pressures ever more than 140 on the top number (systolic) OR more   than 90 on the bottom number (diastolic), for example 140/90?         Review  of Systems  Constitutional, HEENT, cardiovascular, pulmonary, gi and gu systems are negative, except as otherwise noted.      Objective           Vitals:  No vitals were obtained today due to virtual visit.    Physical Exam   GENERAL: alert and no distress  EYES: Eyes grossly normal to inspection.  No discharge or erythema, or obvious scleral/conjunctival abnormalities.  RESP: No audible wheeze, cough, or visible cyanosis.    SKIN: Visible skin clear. No significant rash, abnormal pigmentation or lesions.  NEURO: Cranial nerves grossly intact.  Mentation and speech appropriate for age.  PSYCH: Appropriate affect, tone, and pace of words    Labs pending       Video-Visit Details    Type of service:  Video Visit   Originating Location (pt. Location): Home    Distant Location (provider location):  Off-site  Platform used for Video Visit: Hayden  Signed Electronically by: Karthik Montana PA-C

## 2025-05-29 ENCOUNTER — OFFICE VISIT (OUTPATIENT)
Dept: FAMILY MEDICINE | Facility: CLINIC | Age: 50
End: 2025-05-29
Payer: COMMERCIAL

## 2025-05-29 VITALS
OXYGEN SATURATION: 99 % | BODY MASS INDEX: 35.22 KG/M2 | TEMPERATURE: 97.5 F | RESPIRATION RATE: 16 BRPM | HEART RATE: 82 BPM | SYSTOLIC BLOOD PRESSURE: 132 MMHG | HEIGHT: 70 IN | DIASTOLIC BLOOD PRESSURE: 88 MMHG | WEIGHT: 246 LBS

## 2025-05-29 DIAGNOSIS — M25.472 LEFT ANKLE SWELLING: ICD-10-CM

## 2025-05-29 DIAGNOSIS — I10 BENIGN ESSENTIAL HYPERTENSION: Primary | ICD-10-CM

## 2025-05-29 DIAGNOSIS — M76.62 ACHILLES TENDINITIS OF LEFT LOWER EXTREMITY: ICD-10-CM

## 2025-05-29 NOTE — PROGRESS NOTES
Assessment & Plan     Benign essential hypertension  Blood pressure is under good control he is currently on hydrochlorothiazide/losartan    Left ankle swelling  Complaining of swelling in the left ankle  This happened around 4 days ago  He was wearing flip-flops and working in the yard mulching  He does not remember twisting or hurting his ankle in any way  Did not hear any pops  No history of trauma  Later that night he started having pain  In the morning when he woke up there was a swelling of the ankle  The swelling is waxing and waning  He is able to bear weight but has difficulty walking  It is not warm or red although he noticed some discoloration  Examination shows that there is swelling of the lateral malleolus and minimal swelling of the medial malleolus  Flexion and extension does not elicit any pain however inversion  elicits some pain  He has got some tenderness on palpation of the tendo Achilles just above its insertion  I think this is most probably Achilles tendinitis versus peroneal tendinitis  Discussed about rest/ice/elevation  He is going to wear ankle brace  If symptoms does not resolve in the next 2 to 4 days he will schedule visit with orthopedics  - Orthopedic  Referral; Future    Achilles tendinitis of left lower extremity  As above  the swelling of his ankle could be potentially from Achilles tendinitis versus peroneal tendinitis                Zak Reid is a 49 year old, presenting for the following health issues:  left ankle pain         5/29/2025     1:32 PM   Additional Questions   Roomed by Genny     History of Present Illness       Reason for visit:  Swollen ankle   He is taking medications regularly.                Onset/Duration: 4 days   Description  Location:   left ankle   Joint Swelling: YES  Redness: No  Pain: YES  Warmth: No  Intensity:  6/10 on pain scale.   Progression of Symptoms:  waxing and waning  Accompanying signs and symptoms:   Fevers:  "No  Numbness/tingling/weakness: No  History  Trauma to the area: No  Recent illness:  No  Previous similar problem: No  Previous evaluation:  No  Precipitating or alleviating factors:  Aggravating factors include: Walking is painful.   Therapies tried and outcome: Advil and icing         Review of Systems  Constitutional, HEENT, cardiovascular, pulmonary, gi and gu systems are negative, except as otherwise noted.      Objective    /88   Pulse 82   Temp 97.5  F (36.4  C)   Resp 16   Ht 1.778 m (5' 10\")   Wt 111.6 kg (246 lb)   SpO2 99%   BMI 35.30 kg/m    Body mass index is 35.3 kg/m .  Physical Exam   GENERAL: alert and no distress  EYES: Eyes grossly normal to inspection, PERRL and conjunctivae and sclerae normal  MS: Left ankle is swollen compared to right  Swelling is worse on the lateral malleolus side compared to medial malleolus   There is pain on inversion of the ankle  No deformity appreciated  SKIN: no suspicious lesions or rashes  NEURO: Normal strength and tone, mentation intact and speech normal            Signed Electronically by: Sukumar Spencer MD    "

## 2025-06-02 ENCOUNTER — PATIENT OUTREACH (OUTPATIENT)
Dept: CARE COORDINATION | Facility: CLINIC | Age: 50
End: 2025-06-02
Payer: COMMERCIAL

## 2025-07-12 ENCOUNTER — HEALTH MAINTENANCE LETTER (OUTPATIENT)
Age: 50
End: 2025-07-12